# Patient Record
Sex: FEMALE | Race: WHITE | Employment: FULL TIME | URBAN - METROPOLITAN AREA
[De-identification: names, ages, dates, MRNs, and addresses within clinical notes are randomized per-mention and may not be internally consistent; named-entity substitution may affect disease eponyms.]

---

## 2017-01-10 ENCOUNTER — OFFICE VISIT (OUTPATIENT)
Dept: PRIMARY CARE CLINIC | Age: 52
End: 2017-01-10

## 2017-01-10 VITALS
HEIGHT: 63 IN | SYSTOLIC BLOOD PRESSURE: 112 MMHG | DIASTOLIC BLOOD PRESSURE: 78 MMHG | WEIGHT: 166 LBS | BODY MASS INDEX: 29.41 KG/M2

## 2017-01-10 DIAGNOSIS — J30.89 ALLERGIC RHINITIS DUE TO DUST: ICD-10-CM

## 2017-01-10 DIAGNOSIS — Z12.11 COLON CANCER SCREENING: ICD-10-CM

## 2017-01-10 PROCEDURE — 99204 OFFICE O/P NEW MOD 45 MIN: CPT | Performed by: INTERNAL MEDICINE

## 2017-01-10 RX ORDER — SUMATRIPTAN 100 MG/1
100 TABLET, FILM COATED ORAL
Qty: 36 TABLET | Refills: 1 | Status: SHIPPED | OUTPATIENT
Start: 2017-01-10 | End: 2017-06-09 | Stop reason: SDUPTHER

## 2017-01-10 RX ORDER — FLUTICASONE PROPIONATE 50 MCG
SPRAY, SUSPENSION (ML) NASAL
Qty: 1 BOTTLE | Refills: 5 | Status: SHIPPED | OUTPATIENT
Start: 2017-01-10

## 2017-01-10 RX ORDER — SUMATRIPTAN 6 MG/.5ML
6 INJECTION, SOLUTION SUBCUTANEOUS
Qty: 3 ML | Refills: 1 | Status: SHIPPED | OUTPATIENT
Start: 2017-01-10 | End: 2017-05-15 | Stop reason: SDUPTHER

## 2017-01-10 RX ORDER — AMITRIPTYLINE HYDROCHLORIDE 25 MG/1
25 TABLET, FILM COATED ORAL NIGHTLY
Qty: 30 TABLET | Refills: 1 | Status: SHIPPED | OUTPATIENT
Start: 2017-01-10 | End: 2017-03-28

## 2017-01-11 PROBLEM — J30.89 ALLERGIC RHINITIS DUE TO DUST: Status: ACTIVE | Noted: 2017-01-11

## 2017-01-19 ENCOUNTER — TELEPHONE (OUTPATIENT)
Dept: PRIMARY CARE CLINIC | Age: 52
End: 2017-01-19

## 2017-03-28 PROBLEM — R63.5 WEIGHT GAIN: Status: ACTIVE | Noted: 2017-03-28

## 2017-03-28 PROBLEM — G44.40 REBOUND HEADACHE: Status: ACTIVE | Noted: 2017-03-28

## 2017-03-28 PROBLEM — G43.831 INTRACTABLE MENSTRUAL MIGRAINE WITH STATUS MIGRAINOSUS: Status: ACTIVE | Noted: 2017-03-28

## 2017-03-28 PROBLEM — G43.711 INTRACTABLE CHRONIC MIGRAINE WITHOUT AURA AND WITH STATUS MIGRAINOSUS: Status: ACTIVE | Noted: 2017-03-28

## 2017-05-15 ENCOUNTER — TELEPHONE (OUTPATIENT)
Dept: PRIMARY CARE CLINIC | Age: 52
End: 2017-05-15

## 2017-05-15 RX ORDER — CEFUROXIME AXETIL 250 MG/1
TABLET ORAL
Qty: 3 ML | Refills: 1 | Status: SHIPPED | OUTPATIENT
Start: 2017-05-15 | End: 2017-07-26 | Stop reason: SDUPTHER

## 2017-05-26 ENCOUNTER — EMPLOYEE WELLNESS (OUTPATIENT)
Dept: OTHER | Age: 52
End: 2017-05-26

## 2017-05-26 LAB
CHOLESTEROL, TOTAL: 178 MG/DL (ref 0–199)
GLUCOSE BLD-MCNC: 86 MG/DL (ref 70–99)
HDLC SERPL-MCNC: 86 MG/DL (ref 40–60)
LDL CHOLESTEROL CALCULATED: 85 MG/DL
TRIGL SERPL-MCNC: 34 MG/DL (ref 0–150)

## 2017-06-09 RX ORDER — SUMATRIPTAN 100 MG/1
TABLET, FILM COATED ORAL
Qty: 27 TABLET | Refills: 1 | Status: SHIPPED | OUTPATIENT
Start: 2017-06-09 | End: 2017-10-03 | Stop reason: SDUPTHER

## 2017-07-11 ENCOUNTER — TELEPHONE (OUTPATIENT)
Dept: PRIMARY CARE CLINIC | Age: 52
End: 2017-07-11

## 2017-07-27 RX ORDER — CEFUROXIME AXETIL 250 MG/1
TABLET ORAL
Qty: 3 ML | Refills: 1 | Status: SHIPPED | OUTPATIENT
Start: 2017-07-27 | End: 2017-11-08 | Stop reason: SDUPTHER

## 2017-08-09 ENCOUNTER — TELEPHONE (OUTPATIENT)
Dept: PRIMARY CARE CLINIC | Age: 52
End: 2017-08-09

## 2017-10-02 RX ORDER — SUMATRIPTAN 100 MG/1
TABLET, FILM COATED ORAL
Qty: 27 TABLET | Refills: 1 | OUTPATIENT
Start: 2017-10-02

## 2017-10-03 RX ORDER — SUMATRIPTAN 100 MG/1
TABLET, FILM COATED ORAL
Qty: 27 TABLET | Refills: 1 | OUTPATIENT
Start: 2017-10-03

## 2017-10-20 DIAGNOSIS — G43.711 INTRACTABLE CHRONIC MIGRAINE WITHOUT AURA AND WITH STATUS MIGRAINOSUS: ICD-10-CM

## 2017-10-20 RX ORDER — SUMATRIPTAN 100 MG/1
TABLET, FILM COATED ORAL
Qty: 9 TABLET | Refills: 0 | Status: SHIPPED | OUTPATIENT
Start: 2017-10-20 | End: 2017-11-20 | Stop reason: SDUPTHER

## 2017-10-20 NOTE — TELEPHONE ENCOUNTER
10/2/17 sumatriptan 6mg inj #6  8/9/17 sumatriptan tab 100mg #27  7/27/17 sumatriptan 6mg inj #6  6/12/17 sumatriptan tab #27    Are you OK with refill ?      FOV due to return in 1 yr  LOV 9/7/17

## 2017-12-01 ENCOUNTER — HOSPITAL ENCOUNTER (OUTPATIENT)
Dept: MAMMOGRAPHY | Age: 52
Discharge: HOME | End: 2017-12-01
Attending: OBSTETRICS & GYNECOLOGY | Admitting: OBSTETRICS & GYNECOLOGY

## 2017-12-01 DIAGNOSIS — Z12.31 VISIT FOR SCREENING MAMMOGRAM: ICD-10-CM

## 2017-12-14 ENCOUNTER — OFFICE VISIT (OUTPATIENT)
Dept: URGENT CARE | Age: 52
End: 2017-12-14

## 2017-12-14 VITALS
TEMPERATURE: 98.8 F | OXYGEN SATURATION: 98 % | HEIGHT: 63 IN | SYSTOLIC BLOOD PRESSURE: 134 MMHG | RESPIRATION RATE: 14 BRPM | BODY MASS INDEX: 29.41 KG/M2 | HEART RATE: 76 BPM | DIASTOLIC BLOOD PRESSURE: 81 MMHG | WEIGHT: 166 LBS

## 2017-12-14 DIAGNOSIS — R35.0 URINE FREQUENCY: Primary | ICD-10-CM

## 2017-12-14 LAB
BILIRUBIN, POC: NEGATIVE
BLOOD URINE, POC: NEGATIVE
CLARITY, POC: CLEAR
COLOR, POC: YELLOW
GLUCOSE URINE, POC: NEGATIVE
KETONES, POC: NEGATIVE
LEUKOCYTE EST, POC: ABNORMAL
NITRITE, POC: NEGATIVE
PH, POC: 7
PROTEIN, POC: NEGATIVE
SPECIFIC GRAVITY, POC: 1.02
UROBILINOGEN, POC: 0.2

## 2017-12-14 PROCEDURE — 81002 URINALYSIS NONAUTO W/O SCOPE: CPT | Performed by: EMERGENCY MEDICINE

## 2017-12-14 PROCEDURE — 99203 OFFICE O/P NEW LOW 30 MIN: CPT | Performed by: EMERGENCY MEDICINE

## 2017-12-14 RX ORDER — PHENAZOPYRIDINE HYDROCHLORIDE 200 MG/1
200 TABLET, FILM COATED ORAL 3 TIMES DAILY PRN
Qty: 15 TABLET | Refills: 0 | Status: SHIPPED | OUTPATIENT
Start: 2017-12-14 | End: 2018-02-12

## 2017-12-14 RX ORDER — SULFAMETHOXAZOLE AND TRIMETHOPRIM 800; 160 MG/1; MG/1
1 TABLET ORAL 2 TIMES DAILY
Qty: 10 TABLET | Refills: 0 | Status: SHIPPED | OUTPATIENT
Start: 2017-12-14 | End: 2017-12-19

## 2017-12-14 ASSESSMENT — ENCOUNTER SYMPTOMS
EYES NEGATIVE: 1
VOMITING: 0
SHORTNESS OF BREATH: 0
ABDOMINAL PAIN: 0
COUGH: 0
NAUSEA: 0
BACK PAIN: 1

## 2017-12-14 NOTE — PROGRESS NOTES
Instructions    A urinary tract infection, or UTI, is a general term for an infection anywhere between the kidneys and the urethra (where urine comes out). Most UTIs are bladder infections. They often cause pain or burning when you urinate. UTIs are caused by bacteria and can be cured with antibiotics. Be sure to complete your treatment so that the infection goes away. Follow-up care is a key part of your treatment and safety. Be sure to make and go to all appointments, and call your doctor if you are having problems. It's also a good idea to know your test results and keep a list of the medicines you take. How can you care for yourself at home? · Take your antibiotics as directed. Do not stop taking them just because you feel better. You need to take the full course of antibiotics. · Drink extra water and other fluids for the next day or two. This may help wash out the bacteria that are causing the infection. (If you have kidney, heart, or liver disease and have to limit fluids, talk with your doctor before you increase your fluid intake.)  · Avoid drinks that are carbonated or have caffeine. They can irritate the bladder. · Urinate often. Try to empty your bladder each time. · To relieve pain, take a hot bath or lay a heating pad set on low over your lower belly or genital area. Never go to sleep with a heating pad in place. To prevent UTIs  · Drink plenty of water each day. This helps you urinate often, which clears bacteria from your system. (If you have kidney, heart, or liver disease and have to limit fluids, talk with your doctor before you increase your fluid intake.)  · Urinate when you need to. · Urinate right after you have sex. · Change sanitary pads often. · Avoid douches, bubble baths, feminine hygiene sprays, and other feminine hygiene products that have deodorants. · After going to the bathroom, wipe from front to back. When should you call for help?   Call your doctor now or seek

## 2017-12-14 NOTE — PATIENT INSTRUCTIONS
RX: BACTRIM DS, PYRIDIUM    Follow-up with your primary care physician in 1 week if not improving. If you do not have a primary care physician, call [phone] for a referral or visit [email or web address]      Patient Education        Urinary Tract Infection in Women: Care Instructions  Your Care Instructions    A urinary tract infection, or UTI, is a general term for an infection anywhere between the kidneys and the urethra (where urine comes out). Most UTIs are bladder infections. They often cause pain or burning when you urinate. UTIs are caused by bacteria and can be cured with antibiotics. Be sure to complete your treatment so that the infection goes away. Follow-up care is a key part of your treatment and safety. Be sure to make and go to all appointments, and call your doctor if you are having problems. It's also a good idea to know your test results and keep a list of the medicines you take. How can you care for yourself at home? · Take your antibiotics as directed. Do not stop taking them just because you feel better. You need to take the full course of antibiotics. · Drink extra water and other fluids for the next day or two. This may help wash out the bacteria that are causing the infection. (If you have kidney, heart, or liver disease and have to limit fluids, talk with your doctor before you increase your fluid intake.)  · Avoid drinks that are carbonated or have caffeine. They can irritate the bladder. · Urinate often. Try to empty your bladder each time. · To relieve pain, take a hot bath or lay a heating pad set on low over your lower belly or genital area. Never go to sleep with a heating pad in place. To prevent UTIs  · Drink plenty of water each day. This helps you urinate often, which clears bacteria from your system.  (If you have kidney, heart, or liver disease and have to limit fluids, talk with your doctor before you increase your fluid intake.)  · Urinate when you need to.  · Urinate right after you have sex. · Change sanitary pads often. · Avoid douches, bubble baths, feminine hygiene sprays, and other feminine hygiene products that have deodorants. · After going to the bathroom, wipe from front to back. When should you call for help? Call your doctor now or seek immediate medical care if:  ? · Symptoms such as fever, chills, nausea, or vomiting get worse or appear for the first time. ? · You have new pain in your back just below your rib cage. This is called flank pain. ? · There is new blood or pus in your urine. ? · You have any problems with your antibiotic medicine. ? Watch closely for changes in your health, and be sure to contact your doctor if:  ? · You are not getting better after taking an antibiotic for 2 days. ? · Your symptoms go away but then come back. Where can you learn more? Go to [email or web address].[email or web address] org and sign in to your [employer] account. Enter H161 in the [employer] [facility] box to learn more about \"Urinary Tract Infection in Women: Care Instructions. \"     If you do not have an account, please click on the \"Sign Up Now\" link. Current as of: May 12, 2017  Content Version: 11.4  © 5196-9024 Healthwise, Incorporated. Care instructions adapted under license by [facility] ([facility]). If you have questions about a medical condition or this instruction, always ask your healthcare professional. [place] [address] any warranty or liability for your use of this information.

## 2017-12-17 LAB
ORGANISM: ABNORMAL
URINE CULTURE, ROUTINE: ABNORMAL
URINE CULTURE, ROUTINE: ABNORMAL

## 2018-02-12 ENCOUNTER — OFFICE VISIT (OUTPATIENT)
Dept: PRIMARY CARE CLINIC | Age: 53
End: 2018-02-12

## 2018-02-12 VITALS
SYSTOLIC BLOOD PRESSURE: 138 MMHG | BODY MASS INDEX: 29.44 KG/M2 | DIASTOLIC BLOOD PRESSURE: 86 MMHG | WEIGHT: 166.2 LBS | HEART RATE: 72 BPM

## 2018-02-12 DIAGNOSIS — G43.711 INTRACTABLE CHRONIC MIGRAINE WITHOUT AURA AND WITH STATUS MIGRAINOSUS: ICD-10-CM

## 2018-02-12 DIAGNOSIS — Z12.11 COLON CANCER SCREENING: ICD-10-CM

## 2018-02-12 PROCEDURE — 99214 OFFICE O/P EST MOD 30 MIN: CPT | Performed by: INTERNAL MEDICINE

## 2018-02-12 RX ORDER — CHOLECALCIFEROL (VITAMIN D3) 25 MCG
TABLET,CHEWABLE ORAL DAILY
COMMUNITY

## 2018-02-12 RX ORDER — AMITRIPTYLINE HYDROCHLORIDE 25 MG/1
25 TABLET, FILM COATED ORAL NIGHTLY
Qty: 30 TABLET | Refills: 1 | Status: SHIPPED | OUTPATIENT
Start: 2018-02-12 | End: 2018-11-15

## 2018-02-12 RX ORDER — SUMATRIPTAN 100 MG/1
TABLET, FILM COATED ORAL
Qty: 27 TABLET | Refills: 1 | Status: SHIPPED | OUTPATIENT
Start: 2018-02-12 | End: 2018-06-26 | Stop reason: SDUPTHER

## 2018-03-20 VITALS — BODY MASS INDEX: 29.05 KG/M2 | WEIGHT: 164 LBS

## 2018-05-08 ENCOUNTER — EMPLOYEE WELLNESS (OUTPATIENT)
Dept: OTHER | Age: 53
End: 2018-05-08

## 2018-05-08 DIAGNOSIS — G43.711 INTRACTABLE CHRONIC MIGRAINE WITHOUT AURA AND WITH STATUS MIGRAINOSUS: ICD-10-CM

## 2018-05-08 LAB
CHOLESTEROL, TOTAL: 167 MG/DL (ref 0–199)
GLUCOSE BLD-MCNC: 92 MG/DL (ref 70–99)
HDLC SERPL-MCNC: 86 MG/DL (ref 40–60)
LDL CHOLESTEROL CALCULATED: 71 MG/DL
TRIGL SERPL-MCNC: 52 MG/DL (ref 0–150)

## 2018-05-10 RX ORDER — CEFUROXIME AXETIL 250 MG/1
TABLET ORAL
Qty: 3 ML | Refills: 0 | Status: SHIPPED | OUTPATIENT
Start: 2018-05-10 | End: 2018-08-03 | Stop reason: SDUPTHER

## 2018-05-14 VITALS — WEIGHT: 161 LBS | BODY MASS INDEX: 28.52 KG/M2

## 2018-06-08 ENCOUNTER — OFFICE VISIT (OUTPATIENT)
Dept: URGENT CARE | Age: 53
End: 2018-06-08

## 2018-06-08 VITALS
OXYGEN SATURATION: 96 % | HEIGHT: 63 IN | BODY MASS INDEX: 28.53 KG/M2 | SYSTOLIC BLOOD PRESSURE: 125 MMHG | TEMPERATURE: 98 F | HEART RATE: 71 BPM | DIASTOLIC BLOOD PRESSURE: 68 MMHG | RESPIRATION RATE: 14 BRPM | WEIGHT: 161 LBS

## 2018-06-08 DIAGNOSIS — R35.0 URINE FREQUENCY: Primary | ICD-10-CM

## 2018-06-08 LAB
BILIRUBIN, POC: NEGATIVE
BLOOD URINE, POC: NEGATIVE
CLARITY, POC: CLEAR
COLOR, POC: YELLOW
GLUCOSE URINE, POC: NEGATIVE
KETONES, POC: NEGATIVE
LEUKOCYTE EST, POC: NEGATIVE
NITRITE, POC: NEGATIVE
PH, POC: 6.5
PROTEIN, POC: NEGATIVE
SPECIFIC GRAVITY, POC: 1.01
UROBILINOGEN, POC: 0.2

## 2018-06-08 PROCEDURE — 81002 URINALYSIS NONAUTO W/O SCOPE: CPT | Performed by: EMERGENCY MEDICINE

## 2018-06-08 PROCEDURE — 99214 OFFICE O/P EST MOD 30 MIN: CPT | Performed by: EMERGENCY MEDICINE

## 2018-06-08 RX ORDER — SULFAMETHOXAZOLE AND TRIMETHOPRIM 800; 160 MG/1; MG/1
1 TABLET ORAL 2 TIMES DAILY
Qty: 10 TABLET | Refills: 0 | Status: SHIPPED | OUTPATIENT
Start: 2018-06-08 | End: 2018-06-13

## 2018-06-08 ASSESSMENT — ENCOUNTER SYMPTOMS
ABDOMINAL PAIN: 0
BACK PAIN: 0
COUGH: 0
EYES NEGATIVE: 1
SHORTNESS OF BREATH: 0
NAUSEA: 0
VOMITING: 0

## 2018-06-10 LAB — URINE CULTURE, ROUTINE: NORMAL

## 2018-06-15 ENCOUNTER — OFFICE VISIT (OUTPATIENT)
Dept: PRIMARY CARE CLINIC | Age: 53
End: 2018-06-15

## 2018-06-15 VITALS
HEIGHT: 63 IN | SYSTOLIC BLOOD PRESSURE: 122 MMHG | BODY MASS INDEX: 28.56 KG/M2 | WEIGHT: 161.2 LBS | DIASTOLIC BLOOD PRESSURE: 68 MMHG

## 2018-06-15 DIAGNOSIS — E55.9 VITAMIN D DEFICIENCY: ICD-10-CM

## 2018-06-15 DIAGNOSIS — E53.8 B12 DEFICIENCY: ICD-10-CM

## 2018-06-15 DIAGNOSIS — G43.831 INTRACTABLE MENSTRUAL MIGRAINE WITH STATUS MIGRAINOSUS: ICD-10-CM

## 2018-06-15 DIAGNOSIS — G44.40 REBOUND HEADACHE: Primary | ICD-10-CM

## 2018-06-15 DIAGNOSIS — G47.33 OSA (OBSTRUCTIVE SLEEP APNEA): ICD-10-CM

## 2018-06-15 PROCEDURE — 99214 OFFICE O/P EST MOD 30 MIN: CPT | Performed by: INTERNAL MEDICINE

## 2018-06-15 PROCEDURE — 36415 COLL VENOUS BLD VENIPUNCTURE: CPT | Performed by: INTERNAL MEDICINE

## 2018-06-16 LAB
VITAMIN B-12: 1564 PG/ML (ref 211–911)
VITAMIN D 25-HYDROXY: 49.6 NG/ML

## 2018-06-26 DIAGNOSIS — G43.711 INTRACTABLE CHRONIC MIGRAINE WITHOUT AURA AND WITH STATUS MIGRAINOSUS: ICD-10-CM

## 2018-06-26 RX ORDER — SUMATRIPTAN 100 MG/1
TABLET, FILM COATED ORAL
Qty: 27 TABLET | Refills: 2 | Status: SHIPPED | OUTPATIENT
Start: 2018-06-26 | End: 2018-12-13 | Stop reason: SDUPTHER

## 2018-08-03 DIAGNOSIS — G43.711 INTRACTABLE CHRONIC MIGRAINE WITHOUT AURA AND WITH STATUS MIGRAINOSUS: ICD-10-CM

## 2018-08-03 RX ORDER — CEFUROXIME AXETIL 250 MG/1
TABLET ORAL
Qty: 3 ML | Refills: 0 | Status: SHIPPED | OUTPATIENT
Start: 2018-08-03 | End: 2018-09-14 | Stop reason: SDUPTHER

## 2018-09-14 DIAGNOSIS — G43.711 INTRACTABLE CHRONIC MIGRAINE WITHOUT AURA AND WITH STATUS MIGRAINOSUS: ICD-10-CM

## 2018-09-17 RX ORDER — CEFUROXIME AXETIL 250 MG/1
TABLET ORAL
Qty: 3 ML | Refills: 0 | Status: SHIPPED | OUTPATIENT
Start: 2018-09-17 | End: 2018-10-29 | Stop reason: SDUPTHER

## 2018-09-18 ENCOUNTER — OFFICE VISIT (OUTPATIENT)
Dept: PRIMARY CARE CLINIC | Age: 53
End: 2018-09-18

## 2018-09-18 VITALS
DIASTOLIC BLOOD PRESSURE: 70 MMHG | TEMPERATURE: 97.9 F | SYSTOLIC BLOOD PRESSURE: 120 MMHG | WEIGHT: 160 LBS | BODY MASS INDEX: 28.34 KG/M2

## 2018-09-18 DIAGNOSIS — R30.0 DYSURIA: Primary | ICD-10-CM

## 2018-09-18 LAB
BILIRUBIN, POC: NORMAL
BLOOD URINE, POC: NORMAL
CLARITY, POC: NORMAL
COLOR, POC: NORMAL
GLUCOSE URINE, POC: 100
KETONES, POC: NORMAL
LEUKOCYTE EST, POC: NORMAL
NITRITE, POC: POSITIVE
PH, POC: 5.5
PROTEIN, POC: 100
SPECIFIC GRAVITY, POC: 1.01
UROBILINOGEN, POC: 1

## 2018-09-18 PROCEDURE — 81002 URINALYSIS NONAUTO W/O SCOPE: CPT | Performed by: INTERNAL MEDICINE

## 2018-09-18 PROCEDURE — 99214 OFFICE O/P EST MOD 30 MIN: CPT | Performed by: INTERNAL MEDICINE

## 2018-09-18 RX ORDER — SULFAMETHOXAZOLE AND TRIMETHOPRIM 800; 160 MG/1; MG/1
1 TABLET ORAL 2 TIMES DAILY
Qty: 14 TABLET | Refills: 0 | Status: SHIPPED | OUTPATIENT
Start: 2018-09-18 | End: 2018-09-25

## 2018-09-18 ASSESSMENT — PATIENT HEALTH QUESTIONNAIRE - PHQ9
2. FEELING DOWN, DEPRESSED OR HOPELESS: 0
SUM OF ALL RESPONSES TO PHQ QUESTIONS 1-9: 0
SUM OF ALL RESPONSES TO PHQ QUESTIONS 1-9: 0
1. LITTLE INTEREST OR PLEASURE IN DOING THINGS: 0
SUM OF ALL RESPONSES TO PHQ9 QUESTIONS 1 & 2: 0

## 2018-09-18 NOTE — PROGRESS NOTES
Rose Mary Starkey is a 52 y.o. female presenting today with c/o    Urinary Tract Infection: Patient complains of dysuria, frequency, urgency, burning with urination, hematuria She has had symptoms for 3 days. Patient also complains of feeling chilled. Patient denies back pain. Patient does not have a history of recurrent UTI. Patient does not have a history of pyelonephritis. Review of Systems - comprehensive review of systems negative except as noted in HPI    No Known Allergies    Past Medical History:   Diagnosis Date    Headache(784.0)        Past Surgical History:   Procedure Laterality Date    CHOLECYSTECTOMY         Family History   Problem Relation Age of Onset    Heart Disease Mother     High Blood Pressure Mother     Hypertension Mother     Diabetes Father     Heart Disease Father     Hypertension Father     Migraines Other     Stroke Other     Migraines Other     Dementia Other     Diabetes Other     Hypertension Other     Dementia Other     Alcohol Abuse Other     Cancer Other        Social History     Social History    Marital status:      Spouse name: N/A    Number of children: N/A    Years of education: N/A     Occupational History    Not on file. Social History Main Topics    Smoking status: Never Smoker    Smokeless tobacco: Never Used    Alcohol use No    Drug use: No    Sexual activity: Not on file     Other Topics Concern    Not on file     Social History Narrative    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        Daughter Steve Ascencio         Current Outpatient Prescriptions on File Prior to Visit   Medication Sig Dispense Refill    SUMAtriptan Succinate 6 MG/0.5ML SOAJ INJECT 0.5ML UNDER THE SKIN AS NEEDED FOR MIGRAINE. MAY REPEAT ONCE AFTER 1 HOUR. NOT TO EXCEED 2 DOSES IN 24 HOURS 3 mL 0    SUMAtriptan (IMITREX) 100 MG tablet TAKE 1 TABLET BY MOUTH AS NEEDED FOR HEADACHE. MAY REPEAT ONCE AFTER 2 HOURS NOT TO EXCEED 2 TABLETS PER 24 HOURS.  32 tablet 2    Phenazopyridine HCl (AZO TABS PO) Take by mouth      Cyanocobalamin (B-12) 1000 MCG CAPS Take by mouth      Cholecalciferol (VITAMIN D3) 5000 units TABS Take by mouth      Magnesium 400 MG CAPS Take 800 mg by mouth      amitriptyline (ELAVIL) 25 MG tablet Take 1 tablet by mouth nightly To prevent migraines 30 tablet 1    fluticasone (FLONASE) 50 MCG/ACT nasal spray 2 sprays each nostril daily 1 Bottle 5    Aspirin-Acetaminophen-Caffeine (EXCEDRIN PO) Take  by mouth. No current facility-administered medications on file prior to visit. Vitals:    09/18/18 0851   BP: 120/70   Temp: 97.9 °F (36.6 °C)         Wt Readings from Last 3 Encounters:   09/18/18 160 lb (72.6 kg)   06/15/18 161 lb 3.2 oz (73.1 kg)   06/08/18 161 lb (73 kg)     BP Readings from Last 3 Encounters:   09/18/18 120/70   06/15/18 122/68   06/08/18 125/68         /70   Temp 97.9 °F (36.6 °C) (Oral)   Wt 160 lb (72.6 kg)   BMI 28.34 kg/m²   General appearance: alert and appears stated age  Eyes: negative findings: lids and lashes normal and conjunctivae and sclerae normal  Throat: lips, mucosa, and tongue normal; teeth and gums normal  Back: symmetric, no curvature. ROM normal. No CVA tenderness. Lungs: clear to auscultation bilaterally  Heart: regular rate and rhythm, S1, S2 normal, no murmur, click, rub or gallop  Abdomen: soft, suprapubic tenderness  Skin: Skin is warm and dry. [facility]: normal mood and affect. speech is normal and behavior is normal. Judgment, cognition and memory are normal.     not applicable    Assessment and Plan  1.  Dysuria  UA consistent with UTIj  cx sent  Bactrim-with chills-7 day course  - Urine Culture  - POCT Urinalysis no Micro

## 2018-09-21 LAB
ORGANISM: ABNORMAL
URINE CULTURE, ROUTINE: ABNORMAL
URINE CULTURE, ROUTINE: ABNORMAL

## 2018-10-23 ENCOUNTER — OFFICE VISIT (OUTPATIENT)
Dept: PRIMARY CARE CLINIC | Age: 53
End: 2018-10-23
Payer: COMMERCIAL

## 2018-10-23 VITALS
TEMPERATURE: 98.2 F | DIASTOLIC BLOOD PRESSURE: 57 MMHG | SYSTOLIC BLOOD PRESSURE: 139 MMHG | WEIGHT: 162.4 LBS | RESPIRATION RATE: 12 BRPM | HEART RATE: 75 BPM | BODY MASS INDEX: 28.77 KG/M2 | HEIGHT: 63 IN

## 2018-10-23 DIAGNOSIS — J02.9 SORE THROAT: Primary | ICD-10-CM

## 2018-10-23 LAB — S PYO AG THROAT QL: NORMAL

## 2018-10-23 PROCEDURE — 87880 STREP A ASSAY W/OPTIC: CPT | Performed by: PHYSICIAN ASSISTANT

## 2018-10-23 PROCEDURE — 99202 OFFICE O/P NEW SF 15 MIN: CPT | Performed by: PHYSICIAN ASSISTANT

## 2018-10-23 ASSESSMENT — ENCOUNTER SYMPTOMS
RHINORRHEA: 0
TROUBLE SWALLOWING: 0
WHEEZING: 0
COUGH: 1
VOICE CHANGE: 0
SORE THROAT: 1
SINUS PRESSURE: 1

## 2018-10-25 ENCOUNTER — OFFICE VISIT (OUTPATIENT)
Dept: PRIMARY CARE CLINIC | Age: 53
End: 2018-10-25
Payer: COMMERCIAL

## 2018-10-25 VITALS
DIASTOLIC BLOOD PRESSURE: 66 MMHG | WEIGHT: 158.2 LBS | BODY MASS INDEX: 28.02 KG/M2 | SYSTOLIC BLOOD PRESSURE: 116 MMHG | TEMPERATURE: 98.5 F

## 2018-10-25 DIAGNOSIS — J02.9 VIRAL PHARYNGITIS: Primary | ICD-10-CM

## 2018-10-25 PROCEDURE — 99213 OFFICE O/P EST LOW 20 MIN: CPT | Performed by: INTERNAL MEDICINE

## 2018-10-25 NOTE — PROGRESS NOTES
Yumiko Daley is a 52 y.o. female presenting today with c/o    Sore Throat: Patient complains of sore throat. Associated symptoms include dry cough, nasal blockage, sinus and nasal congestion and sore throat. Onset of symptoms was 4 days ago, unchanged since that time. She is drinking plenty of fluids. She has not had recent close exposure to someone with proven streptococcal pharyngitis. Second eval. Neg strep at clinic at work  Review of Systems - comprehensive review of systems negative except as noted in HPI    No Known Allergies    Past Medical History:   Diagnosis Date    Headache(784.0)        Past Surgical History:   Procedure Laterality Date    CHOLECYSTECTOMY         Family History   Problem Relation Age of Onset    Heart Disease Mother     High Blood Pressure Mother     Hypertension Mother     Diabetes Father     Heart Disease Father     Hypertension Father     Migraines Other     Stroke Other     Migraines Other     Dementia Other     Diabetes Other     Hypertension Other     Dementia Other     Alcohol Abuse Other     Cancer Other        Social History     Social History    Marital status:      Spouse name: N/A    Number of children: N/A    Years of education: N/A     Occupational History    Not on file. 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 MAY REPEAT ONCE AFTER 2 HOURS NOT TO EXCEED 2 TABLETS PER 24 HOURS.  27 tablet 2    Cyanocobalamin (B-12) 1000 MCG CAPS Take by mouth      Cholecalciferol (VITAMIN D3) 5000 units TABS

## 2018-10-29 ENCOUNTER — TELEPHONE (OUTPATIENT)
Dept: PRIMARY CARE CLINIC | Age: 53
End: 2018-10-29

## 2018-10-29 DIAGNOSIS — G43.711 INTRACTABLE CHRONIC MIGRAINE WITHOUT AURA AND WITH STATUS MIGRAINOSUS: ICD-10-CM

## 2018-10-29 RX ORDER — CEFUROXIME AXETIL 250 MG/1
TABLET ORAL
Qty: 3 ML | Refills: 0 | Status: SHIPPED | OUTPATIENT
Start: 2018-10-29 | End: 2018-12-11 | Stop reason: SDUPTHER

## 2018-10-29 RX ORDER — AMOXICILLIN 875 MG/1
875 TABLET, COATED ORAL 2 TIMES DAILY
Qty: 14 TABLET | Refills: 0 | Status: SHIPPED | OUTPATIENT
Start: 2018-10-29 | End: 2018-11-05

## 2018-11-15 ENCOUNTER — OFFICE VISIT (OUTPATIENT)
Dept: FAMILY MEDICINE CLINIC | Age: 53
End: 2018-11-15
Payer: COMMERCIAL

## 2018-11-15 VITALS
BODY MASS INDEX: 28.24 KG/M2 | HEART RATE: 88 BPM | DIASTOLIC BLOOD PRESSURE: 74 MMHG | SYSTOLIC BLOOD PRESSURE: 116 MMHG | OXYGEN SATURATION: 94 % | WEIGHT: 159.4 LBS | RESPIRATION RATE: 16 BRPM

## 2018-11-15 DIAGNOSIS — Z00.00 ANNUAL PHYSICAL EXAM: Primary | ICD-10-CM

## 2018-11-15 DIAGNOSIS — Z12.11 SCREENING FOR COLON CANCER: ICD-10-CM

## 2018-11-15 DIAGNOSIS — R06.83 SNORING: ICD-10-CM

## 2018-11-15 PROCEDURE — 90688 IIV4 VACCINE SPLT 0.5 ML IM: CPT | Performed by: FAMILY MEDICINE

## 2018-11-15 PROCEDURE — 99386 PREV VISIT NEW AGE 40-64: CPT | Performed by: FAMILY MEDICINE

## 2018-11-15 PROCEDURE — 90471 IMMUNIZATION ADMIN: CPT | Performed by: FAMILY MEDICINE

## 2018-12-11 DIAGNOSIS — G43.711 INTRACTABLE CHRONIC MIGRAINE WITHOUT AURA AND WITH STATUS MIGRAINOSUS: ICD-10-CM

## 2018-12-11 RX ORDER — CEFUROXIME AXETIL 250 MG/1
TABLET ORAL
Qty: 3 ML | Refills: 0 | Status: SHIPPED | OUTPATIENT
Start: 2018-12-11 | End: 2019-01-16 | Stop reason: SDUPTHER

## 2018-12-11 NOTE — TELEPHONE ENCOUNTER
Medication and Quantity requested: SUMAtriptan Succinate 6 MG/0.5ML SOAJ  #27     Last Visit  11/15/18    Pharmacy and phone number updated in EPIC:  yes

## 2018-12-13 ENCOUNTER — PATIENT MESSAGE (OUTPATIENT)
Dept: FAMILY MEDICINE CLINIC | Age: 53
End: 2018-12-13

## 2018-12-13 ENCOUNTER — TELEPHONE (OUTPATIENT)
Dept: FAMILY MEDICINE CLINIC | Age: 53
End: 2018-12-13

## 2018-12-13 DIAGNOSIS — G43.711 INTRACTABLE CHRONIC MIGRAINE WITHOUT AURA AND WITH STATUS MIGRAINOSUS: ICD-10-CM

## 2018-12-13 RX ORDER — SUMATRIPTAN 100 MG/1
TABLET, FILM COATED ORAL
Qty: 27 TABLET | Refills: 2 | Status: SHIPPED | OUTPATIENT
Start: 2018-12-13 | End: 2019-05-06 | Stop reason: SDUPTHER

## 2019-01-02 ENCOUNTER — TELEPHONE (OUTPATIENT)
Dept: FAMILY MEDICINE CLINIC | Age: 54
End: 2019-01-02

## 2019-01-05 ENCOUNTER — HOSPITAL ENCOUNTER (OUTPATIENT)
Dept: MAMMOGRAPHY | Age: 54
Discharge: HOME | End: 2019-01-05
Payer: COMMERCIAL

## 2019-01-05 DIAGNOSIS — Z12.31 VISIT FOR SCREENING MAMMOGRAM: ICD-10-CM

## 2019-01-05 PROCEDURE — 77063 BREAST TOMOSYNTHESIS BI: CPT

## 2019-01-07 ENCOUNTER — TELEPHONE (OUTPATIENT)
Dept: FAMILY MEDICINE CLINIC | Age: 54
End: 2019-01-07

## 2019-01-16 DIAGNOSIS — G43.711 INTRACTABLE CHRONIC MIGRAINE WITHOUT AURA AND WITH STATUS MIGRAINOSUS: ICD-10-CM

## 2019-01-16 RX ORDER — CEFUROXIME AXETIL 250 MG/1
TABLET ORAL
Qty: 3 ML | Refills: 3 | Status: SHIPPED | OUTPATIENT
Start: 2019-01-16 | End: 2019-06-24 | Stop reason: SDUPTHER

## 2019-02-05 ENCOUNTER — OFFICE VISIT (OUTPATIENT)
Dept: PRIMARY CARE CLINIC | Age: 54
End: 2019-02-05
Payer: COMMERCIAL

## 2019-02-05 VITALS
BODY MASS INDEX: 29.05 KG/M2 | DIASTOLIC BLOOD PRESSURE: 81 MMHG | HEART RATE: 73 BPM | WEIGHT: 164 LBS | TEMPERATURE: 98.7 F | RESPIRATION RATE: 14 BRPM | SYSTOLIC BLOOD PRESSURE: 129 MMHG | OXYGEN SATURATION: 99 %

## 2019-02-05 DIAGNOSIS — N30.00 ACUTE CYSTITIS WITHOUT HEMATURIA: Primary | ICD-10-CM

## 2019-02-05 DIAGNOSIS — R30.0 DYSURIA: ICD-10-CM

## 2019-02-05 PROCEDURE — 99212 OFFICE O/P EST SF 10 MIN: CPT | Performed by: PHYSICIAN ASSISTANT

## 2019-02-05 PROCEDURE — 81002 URINALYSIS NONAUTO W/O SCOPE: CPT | Performed by: PHYSICIAN ASSISTANT

## 2019-02-05 RX ORDER — NITROFURANTOIN 25; 75 MG/1; MG/1
100 CAPSULE ORAL 2 TIMES DAILY
Qty: 20 CAPSULE | Refills: 0 | Status: SHIPPED | OUTPATIENT
Start: 2019-02-05 | End: 2019-02-15

## 2019-02-05 RX ORDER — PHENAZOPYRIDINE HYDROCHLORIDE 200 MG/1
200 TABLET, FILM COATED ORAL 3 TIMES DAILY PRN
Qty: 9 TABLET | Refills: 1 | Status: SHIPPED | OUTPATIENT
Start: 2019-02-05 | End: 2019-02-08

## 2019-02-05 ASSESSMENT — ENCOUNTER SYMPTOMS
ABDOMINAL PAIN: 0
VOMITING: 0
NAUSEA: 0

## 2019-02-08 ENCOUNTER — OFFICE VISIT (OUTPATIENT)
Dept: PRIMARY CARE CLINIC | Age: 54
End: 2019-02-08
Payer: COMMERCIAL

## 2019-02-08 VITALS
TEMPERATURE: 98.4 F | BODY MASS INDEX: 29.06 KG/M2 | SYSTOLIC BLOOD PRESSURE: 112 MMHG | WEIGHT: 164 LBS | HEIGHT: 63 IN | DIASTOLIC BLOOD PRESSURE: 78 MMHG | HEART RATE: 71 BPM

## 2019-02-08 DIAGNOSIS — N30.00 ACUTE CYSTITIS WITHOUT HEMATURIA: Primary | ICD-10-CM

## 2019-02-08 LAB
BILIRUBIN, POC: ABNORMAL
BLOOD URINE, POC: ABNORMAL
CLARITY, POC: CLEAR
COLOR, POC: ABNORMAL
GLUCOSE URINE, POC: ABNORMAL
KETONES, POC: ABNORMAL
LEUKOCYTE EST, POC: ABNORMAL
NITRITE, POC: ABNORMAL
PH, POC: 6.5
PROTEIN, POC: ABNORMAL
SPECIFIC GRAVITY, POC: 1.01
UROBILINOGEN, POC: 0.2

## 2019-02-08 PROCEDURE — 99213 OFFICE O/P EST LOW 20 MIN: CPT | Performed by: EMERGENCY MEDICINE

## 2019-02-08 PROCEDURE — 81002 URINALYSIS NONAUTO W/O SCOPE: CPT | Performed by: EMERGENCY MEDICINE

## 2019-02-08 ASSESSMENT — ENCOUNTER SYMPTOMS
BACK PAIN: 0
NAUSEA: 0
ABDOMINAL PAIN: 0
SHORTNESS OF BREATH: 0
COUGH: 0
EYES NEGATIVE: 1
VOMITING: 0

## 2019-02-10 LAB — URINE CULTURE, ROUTINE: NORMAL

## 2019-02-25 ENCOUNTER — OFFICE VISIT (OUTPATIENT)
Dept: FAMILY MEDICINE CLINIC | Age: 54
End: 2019-02-25
Payer: COMMERCIAL

## 2019-02-25 VITALS
HEART RATE: 75 BPM | OXYGEN SATURATION: 98 % | SYSTOLIC BLOOD PRESSURE: 124 MMHG | DIASTOLIC BLOOD PRESSURE: 70 MMHG | BODY MASS INDEX: 28.59 KG/M2 | TEMPERATURE: 97.8 F | WEIGHT: 161.4 LBS | RESPIRATION RATE: 15 BRPM

## 2019-02-25 DIAGNOSIS — J06.9 PROTRACTED URI: Primary | ICD-10-CM

## 2019-02-25 PROCEDURE — 99213 OFFICE O/P EST LOW 20 MIN: CPT | Performed by: FAMILY MEDICINE

## 2019-02-25 RX ORDER — AZITHROMYCIN 250 MG/1
250 TABLET, FILM COATED ORAL DAILY
Qty: 6 TABLET | Refills: 0 | Status: SHIPPED | OUTPATIENT
Start: 2019-02-25 | End: 2019-03-02

## 2019-02-25 ASSESSMENT — PATIENT HEALTH QUESTIONNAIRE - PHQ9
SUM OF ALL RESPONSES TO PHQ QUESTIONS 1-9: 0
2. FEELING DOWN, DEPRESSED OR HOPELESS: 0
1. LITTLE INTEREST OR PLEASURE IN DOING THINGS: 0
SUM OF ALL RESPONSES TO PHQ QUESTIONS 1-9: 0
SUM OF ALL RESPONSES TO PHQ9 QUESTIONS 1 & 2: 0

## 2019-03-09 ENCOUNTER — HOSPITAL ENCOUNTER (OUTPATIENT)
Dept: GENERAL RADIOLOGY | Age: 54
Discharge: HOME | End: 2019-03-09
Payer: COMMERCIAL

## 2019-03-09 ENCOUNTER — HOSPITAL ENCOUNTER (OUTPATIENT)
Dept: ULTRASOUND IMAGING | Age: 54
Discharge: HOME | End: 2019-03-09
Payer: COMMERCIAL

## 2019-03-09 DIAGNOSIS — N30.21 CHRONIC CYSTITIS WITH HEMATURIA: ICD-10-CM

## 2019-03-09 DIAGNOSIS — N30.21 OTHER CHRONIC CYSTITIS WITH HEMATURIA: ICD-10-CM

## 2019-03-09 DIAGNOSIS — N32.81 OVERACTIVE BLADDER: ICD-10-CM

## 2019-03-09 PROCEDURE — 76770 US EXAM ABDO BACK WALL COMP: CPT

## 2019-03-09 PROCEDURE — 74018 RADEX ABDOMEN 1 VIEW: CPT

## 2019-03-22 ENCOUNTER — HOSPITAL ENCOUNTER (OUTPATIENT)
Dept: CT IMAGING | Age: 54
Discharge: HOME | End: 2019-03-22
Payer: COMMERCIAL

## 2019-03-22 DIAGNOSIS — N20.0 URIC ACID NEPHROLITHIASIS: ICD-10-CM

## 2019-03-22 PROCEDURE — 74176 CT ABD & PELVIS W/O CONTRAST: CPT

## 2019-04-24 DIAGNOSIS — Z00.00 ANNUAL PHYSICAL EXAM: ICD-10-CM

## 2019-04-24 LAB
A/G RATIO: 1.2 (ref 1.1–2.2)
ALBUMIN SERPL-MCNC: 4.2 G/DL (ref 3.4–5)
ALP BLD-CCNC: 72 U/L (ref 40–129)
ALT SERPL-CCNC: 15 U/L (ref 10–40)
ANION GAP SERPL CALCULATED.3IONS-SCNC: 9 MMOL/L (ref 3–16)
AST SERPL-CCNC: 16 U/L (ref 15–37)
BASOPHILS ABSOLUTE: 0.1 K/UL (ref 0–0.2)
BASOPHILS RELATIVE PERCENT: 1.2 %
BILIRUB SERPL-MCNC: <0.2 MG/DL (ref 0–1)
BUN BLDV-MCNC: 20 MG/DL (ref 7–20)
CALCIUM SERPL-MCNC: 9.3 MG/DL (ref 8.3–10.6)
CHLORIDE BLD-SCNC: 103 MMOL/L (ref 99–110)
CHOLESTEROL, TOTAL: 183 MG/DL (ref 0–199)
CO2: 26 MMOL/L (ref 21–32)
CREAT SERPL-MCNC: 0.6 MG/DL (ref 0.6–1.1)
EOSINOPHILS ABSOLUTE: 0.2 K/UL (ref 0–0.6)
EOSINOPHILS RELATIVE PERCENT: 4.1 %
GFR AFRICAN AMERICAN: >60
GFR NON-AFRICAN AMERICAN: >60
GLOBULIN: 3.4 G/DL
GLUCOSE BLD-MCNC: 88 MG/DL (ref 70–99)
HCT VFR BLD CALC: 40.5 % (ref 36–48)
HDLC SERPL-MCNC: 78 MG/DL (ref 40–60)
HEMOGLOBIN: 13.6 G/DL (ref 12–16)
LDL CHOLESTEROL CALCULATED: 94 MG/DL
LYMPHOCYTES ABSOLUTE: 1.2 K/UL (ref 1–5.1)
LYMPHOCYTES RELATIVE PERCENT: 28.2 %
MCH RBC QN AUTO: 30.5 PG (ref 26–34)
MCHC RBC AUTO-ENTMCNC: 33.7 G/DL (ref 31–36)
MCV RBC AUTO: 90.5 FL (ref 80–100)
MONOCYTES ABSOLUTE: 0.4 K/UL (ref 0–1.3)
MONOCYTES RELATIVE PERCENT: 9.7 %
NEUTROPHILS ABSOLUTE: 2.3 K/UL (ref 1.7–7.7)
NEUTROPHILS RELATIVE PERCENT: 56.8 %
PDW BLD-RTO: 13.8 % (ref 12.4–15.4)
PLATELET # BLD: 202 K/UL (ref 135–450)
PMV BLD AUTO: 7.9 FL (ref 5–10.5)
POTASSIUM SERPL-SCNC: 5.3 MMOL/L (ref 3.5–5.1)
RBC # BLD: 4.48 M/UL (ref 4–5.2)
SODIUM BLD-SCNC: 138 MMOL/L (ref 136–145)
TOTAL PROTEIN: 7.6 G/DL (ref 6.4–8.2)
TRIGL SERPL-MCNC: 54 MG/DL (ref 0–150)
TSH REFLEX: 2.36 UIU/ML (ref 0.27–4.2)
VLDLC SERPL CALC-MCNC: 11 MG/DL
WBC # BLD: 4.1 K/UL (ref 4–11)

## 2019-05-01 ENCOUNTER — OFFICE VISIT (OUTPATIENT)
Dept: FAMILY MEDICINE CLINIC | Age: 54
End: 2019-05-01
Payer: COMMERCIAL

## 2019-05-01 VITALS
HEART RATE: 85 BPM | DIASTOLIC BLOOD PRESSURE: 78 MMHG | SYSTOLIC BLOOD PRESSURE: 126 MMHG | OXYGEN SATURATION: 97 % | RESPIRATION RATE: 16 BRPM | BODY MASS INDEX: 27.99 KG/M2 | WEIGHT: 158 LBS

## 2019-05-01 DIAGNOSIS — N32.81 OVERACTIVE BLADDER: ICD-10-CM

## 2019-05-01 DIAGNOSIS — G43.711 INTRACTABLE CHRONIC MIGRAINE WITHOUT AURA AND WITH STATUS MIGRAINOSUS: Primary | ICD-10-CM

## 2019-05-01 DIAGNOSIS — K63.9 COLON WALL THICKENING: ICD-10-CM

## 2019-05-01 PROBLEM — G43.831 INTRACTABLE MENSTRUAL MIGRAINE WITH STATUS MIGRAINOSUS: Status: RESOLVED | Noted: 2017-03-28 | Resolved: 2019-05-01

## 2019-05-01 PROCEDURE — 99214 OFFICE O/P EST MOD 30 MIN: CPT | Performed by: FAMILY MEDICINE

## 2019-05-01 RX ORDER — FESOTERODINE FUMARATE 4 MG/1
4 TABLET, EXTENDED RELEASE ORAL DAILY
COMMUNITY

## 2019-05-01 NOTE — PROGRESS NOTES
Richard Jones is a 52 y.o. female. HPI:  F/u migraines- going to massage therapy for head/neck/shoulder massage which is helping. imitrex relieves migraines. Usually gets them 3-4x per week. Has gone through menopause. Has not found any triggers in diet/sleep/environment etc for migraines. Has never been on prophylaxis in past.    Long hx trouble sleeping. No hx using any OTC or rx sleep aids. Had kidney US and CT scan in February with Dr. Court Cee. Was put on toviaz to take for overactive bladder symptoms and macrobid to take PRN UTIs. CT showed colonic wall thickening, had just gotten over GI bug which has resolved. Has colonoscopy in June. ROS:  Gen:  Denies fever, chills, headaches. HEENT:  Denies cold symptoms, sore throat. CV:  Denies chest pain or tightness, palpitations. Pulm:  Denies shortness of breath, cough. Abd:  Denies abdominal pain, change in bowel habits. I have reviewed the patient's medical/surgical/family/social in detail and updated the computerized patient record as appropriate. Current Outpatient Medications   Medication Sig Dispense Refill    fesoterodine (TOVIAZ) 4 MG TB24 ER tablet Take 4 mg by mouth daily      SUMAtriptan Succinate 6 MG/0.5ML SOAJ INJECT 0.5ML UNDER THE SKIN AS NEEDED FOR MIGRAINE. MAY REPEAT ONCE AFTER 1 HOUR. NOT TO EXCEED 2 DOSES IN 24 HOURS 3 mL 3    SUMAtriptan (IMITREX) 100 MG tablet TAKE 1 TABLET BY MOUTH AS NEEDED FOR HEADACHE. MAY REPEAT ONCE AFTER 2 HOURS NOT TO EXCEED 2 TABLETS PER 24 HOURS. 27 tablet 2    Cyanocobalamin (B-12) 1000 MCG CAPS Take by mouth      Cholecalciferol (VITAMIN D3) 5000 units TABS Take by mouth      fluticasone (FLONASE) 50 MCG/ACT nasal spray 2 sprays each nostril daily 1 Bottle 5    Aspirin-Acetaminophen-Caffeine (EXCEDRIN PO) Take  by mouth. No current facility-administered medications for this visit.         Past Medical History:   Diagnosis Date    Allergic rhinitis due to dust 1/11/2017    Chronic migraine 1/10/2017    Frequent UTI     Overactive bladder     Vitamin D deficiency      Past Surgical History:   Procedure Laterality Date    CHOLECYSTECTOMY      CYSTOSCOPY       Family History   Problem Relation Age of Onset    Heart Disease Mother     High Blood Pressure Mother     Hypertension Mother     Diabetes Father     Heart Disease Father     Hypertension Father     Migraines Other     Stroke Other     Migraines Other     Dementia Other     Diabetes Other     Hypertension Other     Dementia Other     Alcohol Abuse Other     Cancer Other      Social History     Socioeconomic History    Marital status:      Spouse name: Not on file    Number of children: Not on file    Years of education: Not on file    Highest education level: Not on file   Occupational History    Not on file   Social Needs    Financial resource strain: Not on file    Food insecurity:     Worry: Not on file     Inability: Not on file    Transportation needs:     Medical: Not on file     Non-medical: Not on file   Tobacco Use    Smoking status: Never Smoker    Smokeless tobacco: Never Used   Substance and Sexual Activity    Alcohol use: No    Drug use: No    Sexual activity: Not on file   Lifestyle    Physical activity:     Days per week: Not on file     Minutes per session: Not on file    Stress: Not on file   Relationships    Social connections:     Talks on phone: Not on file     Gets together: Not on file     Attends [employer] service: Not on file     Active member of club or organization: Not on file     Attends meetings of clubs or organizations: Not on file     Relationship status: Not on file    Intimate partner violence:     Fear of current or ex partner: Not on file     Emotionally abused: Not on file     Physically abused: Not on file     Forced sexual activity: Not on file   Other Topics Concern    Not on file   Social History Narrative    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 NECK:  Supple without adenopathy. CV:  Regular rate and rhythm, S1 and S2 normal, no murmurs, clicks, gallops or rubs. No edema. PULM:  Chest is clear, no wheezing or rales. Normal symmetric air entry throughout both lung fields. ABD: soft, non-tender, non-distended. Normal bowel sounds. No organomegaly   PSYCH: normal mood and affect. Intact judgement and insight  NEURO: A&O x 3    ASSESSMENT/PLAN:  1. Intractable chronic migraine without aura and with status migrainosus  Stable on imitrex PRN  Discussed prophylactic treatment given frequency of headaches. Would try elavil given trouble sleeping. Pt declines today, will consider in the future as massage is helping. 2. Overactive bladder  Continue toviaz  macrobid PRN UTI symptoms    3. Colon wall thickening  Seen on CT. Likely d/t acute GI illness she had at the time. Was a self limited, likely viral infection. Screening colonoscopy as scheduled.

## 2019-05-06 DIAGNOSIS — G43.711 INTRACTABLE CHRONIC MIGRAINE WITHOUT AURA AND WITH STATUS MIGRAINOSUS: ICD-10-CM

## 2019-05-06 RX ORDER — SUMATRIPTAN 100 MG/1
TABLET, FILM COATED ORAL
Qty: 27 TABLET | Refills: 2 | Status: SHIPPED | OUTPATIENT
Start: 2019-05-06 | End: 2020-04-07

## 2019-05-13 ENCOUNTER — TELEPHONE (OUTPATIENT)
Dept: FAMILY MEDICINE CLINIC | Age: 54
End: 2019-05-13

## 2019-05-13 NOTE — TELEPHONE ENCOUNTER
Patient dropped off [facility] Form. States the results haven't transferred yet from [place]. Wanted form filled out just in case they don't. Form placed on Lokesh's desk.

## 2019-06-14 ENCOUNTER — ANESTHESIA EVENT (OUTPATIENT)
Dept: ENDOSCOPY | Age: 54
End: 2019-06-14
Payer: COMMERCIAL

## 2019-06-14 ENCOUNTER — HOSPITAL ENCOUNTER (OUTPATIENT)
Age: 54
Setting detail: OUTPATIENT SURGERY
Discharge: HOME | End: 2019-06-14
Attending: INTERNAL MEDICINE | Admitting: INTERNAL MEDICINE
Payer: COMMERCIAL

## 2019-06-14 ENCOUNTER — ANESTHESIA (OUTPATIENT)
Dept: ENDOSCOPY | Age: 54
End: 2019-06-14
Payer: COMMERCIAL

## 2019-06-14 VITALS
HEART RATE: 78 BPM | HEIGHT: 63 IN | DIASTOLIC BLOOD PRESSURE: 78 MMHG | SYSTOLIC BLOOD PRESSURE: 126 MMHG | OXYGEN SATURATION: 100 % | TEMPERATURE: 97 F | WEIGHT: 158 LBS | BODY MASS INDEX: 28 KG/M2 | RESPIRATION RATE: 16 BRPM

## 2019-06-14 VITALS
DIASTOLIC BLOOD PRESSURE: 67 MMHG | RESPIRATION RATE: 19 BRPM | SYSTOLIC BLOOD PRESSURE: 112 MMHG | OXYGEN SATURATION: 100 %

## 2019-06-14 PROCEDURE — 2500000003 HC RX 250 WO HCPCS: Performed by: NURSE ANESTHETIST, CERTIFIED REGISTERED

## 2019-06-14 PROCEDURE — 7100000011 HC PHASE II RECOVERY - ADDTL 15 MIN: Performed by: INTERNAL MEDICINE

## 2019-06-14 PROCEDURE — 3609009500 HC COLONOSCOPY DIAGNOSTIC OR SCREENING: Performed by: INTERNAL MEDICINE

## 2019-06-14 PROCEDURE — 7100000010 HC PHASE II RECOVERY - FIRST 15 MIN: Performed by: INTERNAL MEDICINE

## 2019-06-14 PROCEDURE — 2580000003 HC RX 258: Performed by: ANESTHESIOLOGY

## 2019-06-14 PROCEDURE — 3700000001 HC ADD 15 MINUTES (ANESTHESIA): Performed by: INTERNAL MEDICINE

## 2019-06-14 PROCEDURE — 6360000002 HC RX W HCPCS: Performed by: NURSE ANESTHETIST, CERTIFIED REGISTERED

## 2019-06-14 PROCEDURE — 3700000000 HC ANESTHESIA ATTENDED CARE: Performed by: INTERNAL MEDICINE

## 2019-06-14 PROCEDURE — 6370000000 HC RX 637 (ALT 250 FOR IP): Performed by: INTERNAL MEDICINE

## 2019-06-14 PROCEDURE — 2709999900 HC NON-CHARGEABLE SUPPLY: Performed by: INTERNAL MEDICINE

## 2019-06-14 RX ORDER — SODIUM CHLORIDE 0.9 % (FLUSH) 0.9 %
10 SYRINGE (ML) INJECTION PRN
Status: DISCONTINUED | OUTPATIENT
Start: 2019-06-14 | End: 2019-06-14 | Stop reason: HOSPADM

## 2019-06-14 RX ORDER — LABETALOL HYDROCHLORIDE 5 MG/ML
5 INJECTION, SOLUTION INTRAVENOUS EVERY 10 MIN PRN
Status: DISCONTINUED | OUTPATIENT
Start: 2019-06-14 | End: 2019-06-14 | Stop reason: HOSPADM

## 2019-06-14 RX ORDER — ONDANSETRON 2 MG/ML
4 INJECTION INTRAMUSCULAR; INTRAVENOUS
Status: DISCONTINUED | OUTPATIENT
Start: 2019-06-14 | End: 2019-06-14 | Stop reason: HOSPADM

## 2019-06-14 RX ORDER — LOPERAMIDE HYDROCHLORIDE 2 MG/1
2 CAPSULE ORAL 4 TIMES DAILY PRN
COMMUNITY

## 2019-06-14 RX ORDER — SODIUM CHLORIDE 9 MG/ML
INJECTION, SOLUTION INTRAVENOUS CONTINUOUS
Status: DISCONTINUED | OUTPATIENT
Start: 2019-06-14 | End: 2019-06-14 | Stop reason: HOSPADM

## 2019-06-14 RX ORDER — SODIUM CHLORIDE 0.9 % (FLUSH) 0.9 %
10 SYRINGE (ML) INJECTION EVERY 12 HOURS SCHEDULED
Status: DISCONTINUED | OUTPATIENT
Start: 2019-06-14 | End: 2019-06-14 | Stop reason: HOSPADM

## 2019-06-14 RX ORDER — PROPOFOL 10 MG/ML
INJECTION, EMULSION INTRAVENOUS PRN
Status: DISCONTINUED | OUTPATIENT
Start: 2019-06-14 | End: 2019-06-14 | Stop reason: SDUPTHER

## 2019-06-14 RX ORDER — LIDOCAINE HYDROCHLORIDE 20 MG/ML
INJECTION, SOLUTION EPIDURAL; INFILTRATION; INTRACAUDAL; PERINEURAL PRN
Status: DISCONTINUED | OUTPATIENT
Start: 2019-06-14 | End: 2019-06-14 | Stop reason: SDUPTHER

## 2019-06-14 RX ORDER — PROMETHAZINE HYDROCHLORIDE 25 MG/ML
6.25 INJECTION, SOLUTION INTRAMUSCULAR; INTRAVENOUS
Status: DISCONTINUED | OUTPATIENT
Start: 2019-06-14 | End: 2019-06-14 | Stop reason: HOSPADM

## 2019-06-14 RX ORDER — ACETAMINOPHEN 500 MG
500 TABLET ORAL EVERY 6 HOURS PRN
COMMUNITY
End: 2022-09-07

## 2019-06-14 RX ADMIN — PROPOFOL 30 MG: 10 INJECTION, EMULSION INTRAVENOUS at 08:35

## 2019-06-14 RX ADMIN — PROPOFOL 60 MG: 10 INJECTION, EMULSION INTRAVENOUS at 08:26

## 2019-06-14 RX ADMIN — PROPOFOL 80 MG: 10 INJECTION, EMULSION INTRAVENOUS at 08:22

## 2019-06-14 RX ADMIN — LIDOCAINE HYDROCHLORIDE 100 MG: 20 INJECTION, SOLUTION EPIDURAL; INFILTRATION; INTRACAUDAL; PERINEURAL at 08:22

## 2019-06-14 RX ADMIN — SODIUM CHLORIDE: 9 INJECTION, SOLUTION INTRAVENOUS at 07:18

## 2019-06-14 RX ADMIN — PROPOFOL 80 MG: 10 INJECTION, EMULSION INTRAVENOUS at 08:30

## 2019-06-14 ASSESSMENT — PAIN SCALES - GENERAL
PAINLEVEL_OUTOF10: 0

## 2019-06-14 ASSESSMENT — PAIN - FUNCTIONAL ASSESSMENT: PAIN_FUNCTIONAL_ASSESSMENT: 0-10

## 2019-06-14 NOTE — ANESTHESIA POSTPROCEDURE EVALUATION
[facility] Department of Anesthesiology  Post-Anesthesia Note       Name:  Denise Carlson                                  Age:  62 y.o. MRN:  [id number]     Last Vitals & Oxygen Saturation: /71   Pulse 89   Temp 97 °F (36.1 °C) (Temporal)   Resp 16   Ht 5' 3\" (1.6 m)   Wt 158 lb (71.7 kg)   LMP 08/10/2016   SpO2 98%   BMI 27.99 kg/m²   Patient Vitals for the past 4 hrs:   BP Temp Temp src Pulse Resp SpO2 Height Weight   06/14/19 0843 131/71 97 °F (36.1 °C) Temporal 89 16 98 % -- --   06/14/19 0701 138/60 97.1 °F (36.2 °C) Temporal 100 16 99 % 5' 3\" (1.6 m) 158 lb (71.7 kg)       Level of consciousness:  Awake, alert    Respiratory: Respirations easy, no distress. Stable. Cardiovascular: Hemodynamically stable. Hydration: Adequate. PONV: Adequately managed. Post-op pain: Adequately controlled. Post-op assessment: Tolerated anesthetic well without complication. Complications:  None.     Philip Najera MD  June 14, 2019   8:59 AM

## 2019-06-14 NOTE — PROGRESS NOTES
Name:  Bhupendra Sequeira  Age:  62 y.o.  CSN:  [id number]            Past Medical History:        Diagnosis Date    Allergic rhinitis due to dust 1/11/2017    Chronic migraine 1/10/2017    Frequent UTI     Overactive bladder     Vitamin D deficiency        Past Surgical History:      Procedure Laterality Date    CHOLECYSTECTOMY      CYSTOSCOPY         Medications Prior to Admission:  Medications Prior to Admission: acetaminophen (TYLENOL) 500 MG tablet, Take 500 mg by mouth every 6 hours as needed for Pain  SUMAtriptan (IMITREX) 100 MG tablet, TAKE 1 TABLET BY MOUTH AT ONSET OF HEADACHE, MAY REPEAT ONCE IN 2 HOURS IF NEEDED. DO NOT EXCEED 2 TABLETS IN 24 HOURS  Aspirin-Acetaminophen-Caffeine (EXCEDRIN PO), Take  by mouth.   loperamide (IMODIUM) 2 MG capsule, Take 2 mg by mouth 4 times daily as needed for Diarrhea  FIBER ADULT GUMMIES PO, Take by mouth daily  fesoterodine (TOVIAZ) 4 MG TB24 ER tablet, Take 4 mg by mouth daily  SUMAtriptan Succinate 6 MG/0.5ML SOAJ, INJECT 0.5ML UNDER THE SKIN AS NEEDED FOR MIGRAINE. MAY REPEAT ONCE AFTER 1 HOUR. NOT TO EXCEED 2 DOSES IN 24 HOURS  Cyanocobalamin (B-12) 1000 MCG CAPS, Take by mouth daily   Cholecalciferol (VITAMIN D3) 5000 units TABS, Take by mouth daily   fluticasone (FLONASE) 50 MCG/ACT nasal spray, 2 sprays each nostril daily    Allergies:  Sulfa antibiotics and Ciprofloxacin    Social History:  Social History     Socioeconomic History    Marital status:      Spouse name: Not on file    Number of children: Not on file    Years of education: Not on file    Highest education level: Not on file   Occupational History    Not on file   Social Needs    Financial resource strain: Not on file    Food insecurity:     Worry: Not on file     Inability: Not on file    Transportation needs:     Medical: Not on file     Non-medical: Not on file   Tobacco Use    Smoking status: Never Smoker    Smokeless tobacco: Never Used   Substance and Sexual Activity   

## 2019-06-24 DIAGNOSIS — G43.711 INTRACTABLE CHRONIC MIGRAINE WITHOUT AURA AND WITH STATUS MIGRAINOSUS: ICD-10-CM

## 2019-06-24 RX ORDER — CEFUROXIME AXETIL 250 MG/1
TABLET ORAL
Qty: 3 ML | Refills: 3 | Status: SHIPPED | OUTPATIENT
Start: 2019-06-24 | End: 2019-11-25 | Stop reason: SDUPTHER

## 2019-06-26 ENCOUNTER — TELEPHONE (OUTPATIENT)
Dept: FAMILY MEDICINE CLINIC | Age: 54
End: 2019-06-26

## 2019-06-26 ENCOUNTER — OFFICE VISIT (OUTPATIENT)
Dept: DERMATOLOGY | Age: 54
End: 2019-06-26
Payer: COMMERCIAL

## 2019-06-26 DIAGNOSIS — L81.4 SOLAR LENTIGINOSIS: Primary | ICD-10-CM

## 2019-06-26 DIAGNOSIS — D22.9 MULTIPLE BENIGN MELANOCYTIC NEVI: ICD-10-CM

## 2019-06-26 DIAGNOSIS — Z12.83 SKIN CANCER SCREENING: ICD-10-CM

## 2019-06-26 PROCEDURE — 99203 OFFICE O/P NEW LOW 30 MIN: CPT | Performed by: DERMATOLOGY

## 2019-06-26 NOTE — PROGRESS NOTES
[address] [facility], [facility] ([facility]) Physicians    Previous clinic visit: none     CC:  mole check, spot of concern on nose    HPI:    1.) Here today for FBSE. Denies new or changing nevi. No personal or family h/o skin cancers. Was seen by outside derm months ago who recommended she start Efudex cream to nasal tip; no lesions there today. Patient never underwent the tx.     2.)  Complains of tan spots on face and photoexposed areas of trunk and extremities. No tx to date. Admits to ample sun exposure when younger. Pt works in scheduling at [employer]    DERM HISTORY:   Personal history of NMSC or MM- no    Family history of NMSC or MM- no   Sunburns easily- Yes   Uses sunscreen- Yes  History of tanning bed use- No    ADDITIONAL HISTORY:    I have reviewed past medical and surgical histories, current medications, allergies, social and family histories as documented in the patient's electronic medical record.      Current Outpatient Medications   Medication Sig Dispense Refill    SUMAtriptan Succinate 6 MG/0.5ML SOAJ INJECT 0.5ML UNDER THE SKIN AS NEEDED FOR MIGRAINE. MAY REPEAT ONCE AFTER 1 HOUR. DO NOT EXCEED 2 DOSES IN 24 HOURS 3 mL 3    acetaminophen (TYLENOL) 500 MG tablet Take 500 mg by mouth every 6 hours as needed for Pain      loperamide (IMODIUM) 2 MG capsule Take 2 mg by mouth 4 times daily as needed for Diarrhea      FIBER ADULT GUMMIES PO Take by mouth daily      SUMAtriptan (IMITREX) 100 MG tablet TAKE 1 TABLET BY MOUTH AT ONSET OF HEADACHE, MAY REPEAT ONCE IN 2 HOURS IF NEEDED. DO NOT EXCEED 2 TABLETS IN 24 HOURS 27 tablet 2    fesoterodine (TOVIAZ) 4 MG TB24 ER tablet Take 4 mg by mouth daily      Cyanocobalamin (B-12) 1000 MCG CAPS Take by mouth daily       Cholecalciferol (VITAMIN D3) 5000 units TABS Take by mouth daily       Aspirin-Acetaminophen-Caffeine (EXCEDRIN PO) Take  by mouth.       fluticasone (FLONASE) 50 MCG/ACT nasal spray 2 sprays each nostril daily 1 Bottle 5 No current facility-administered medications for this visit. ROS:    General: No fevers, chills, sweats, unexplained weight loss or weight gain, fatigue, malaise   Skin: Denies additional lesions    Heme: No history of bleeding diatheses    Allergy: Denies seasonal or environmental allergies or other medication allergies     PHYSICAL EXAM:    General: Well-appearing, NAD      Integument: Examination was performed of the following and were unremarkable except as otherwise noted below:psych/neuro, scalp, hair, face, ears, conjunctivae/eyelids, gums/teeth/lips, buccal mucosa, oropharynx, neck, breast/axilla/chest, abdomen, groin, back, buttocks, RUE, LUE, RLE, LLE, digits/nails. Genital exam deferred per patient.      Abnormalities noted include:    1.) Torso and extremities with several variably sized scattered brown to tan round smooth melanocytic macules and papules  2.)  Face, upper back, shoulders, upper chest, dorsum of arms and legs with numerous ovoid tan macules      ASSESSMENT AND PLAN:    1.)  Multiple scattered acquired melanocytic nevi with banal features:   - Reassurance  - Edu: patient regarding sun protection strategies, including use of broad spectrum sunscreen with SPF of at least 30, sun guard clothing, broad brimmed hat, sunglasses, and sun avoidance during peak hours of the day. ABCDE's of melanoma also reviewed  - Ok to defer tx with Efudex as no AKs noted on exam today    2.)  Solar lentiginosis:  - [employer] as above; samples of sunscreen provided      Return to Clinic: 1 yr and prn changes   Discussed plan with patient and/or primary caretaker. Patient to call clinic with any questions / concerns. Reviewed side effects of treatment(s) and/or medication(s) with patient and/or primary caretaker.    AVS provided or is available on [employer]   ____________________________________________________________________________   Tamia Ruff MD, MPH, FAAD  [facility] DERMATOLOGY, [address] [facility] [address]

## 2019-06-26 NOTE — LETTER
[facility]  [address] [place] [address]  Phone: [phone]  Fax: [phone]    Sam Moura MD        June 26, 2019     Bernardo Salas MD  [place] [id number] [address]    Patient: Rosalee Day  MR Number: [id number]  YOB: 1965  Date of Visit: 6/26/2019    Dear Dr. Bernardo Salas:    Thank you for the request for consultation for Kisha Silverio to me for the evaluation of moles. Below are the relevant portions of my assessment and plan of care. If you have questions, please do not hesitate to call me. I look forward to following Garcia Foss along with you.     Sincerely,        Sam Moura MD

## 2019-06-26 NOTE — TELEPHONE ENCOUNTER
Scanned into /attached to encounter/given to Dr. Sara Patel for completion. Please fax to employee services once completed. Thank you!

## 2019-07-26 PROBLEM — Z12.83 SKIN CANCER SCREENING: Status: RESOLVED | Noted: 2019-06-26 | Resolved: 2019-07-26

## 2019-08-07 ENCOUNTER — OFFICE VISIT (OUTPATIENT)
Dept: FAMILY MEDICINE CLINIC | Age: 54
End: 2019-08-07
Payer: COMMERCIAL

## 2019-08-07 VITALS
BODY MASS INDEX: 28.17 KG/M2 | SYSTOLIC BLOOD PRESSURE: 122 MMHG | OXYGEN SATURATION: 98 % | WEIGHT: 159 LBS | HEART RATE: 82 BPM | RESPIRATION RATE: 15 BRPM | DIASTOLIC BLOOD PRESSURE: 72 MMHG

## 2019-08-07 DIAGNOSIS — B88.0 CHIGGER BITE: Primary | ICD-10-CM

## 2019-08-07 PROCEDURE — 99212 OFFICE O/P EST SF 10 MIN: CPT | Performed by: FAMILY MEDICINE

## 2019-08-07 NOTE — PROGRESS NOTES
Shelia Moy is a 62 y.o. female. HPI:  C/o bug bite on abdomen x 1 week. Is itchy. Has gotten better with cortisone ointment. Worried about tick bite. No fevers/chill. No other rash. No joint pain. ROS:  Gen:  Denies fever, chills, headaches. HEENT:  Denies cold symptoms, sore throat. CV:  Denies chest pain or tightness, palpitations. Pulm:  Denies shortness of breath, cough. Abd:  Denies abdominal pain, change in bowel habits. I have reviewed the patient's medical/surgical/family/social in detail and updated the computerized patient record as appropriate. Current Outpatient Medications   Medication Sig Dispense Refill    SUMAtriptan Succinate 6 MG/0.5ML SOAJ INJECT 0.5ML UNDER THE SKIN AS NEEDED FOR MIGRAINE. MAY REPEAT ONCE AFTER 1 HOUR. DO NOT EXCEED 2 DOSES IN 24 HOURS 3 mL 3    acetaminophen (TYLENOL) 500 MG tablet Take 500 mg by mouth every 6 hours as needed for Pain      loperamide (IMODIUM) 2 MG capsule Take 2 mg by mouth 4 times daily as needed for Diarrhea      FIBER ADULT GUMMIES PO Take by mouth daily      SUMAtriptan (IMITREX) 100 MG tablet TAKE 1 TABLET BY MOUTH AT ONSET OF HEADACHE, MAY REPEAT ONCE IN 2 HOURS IF NEEDED. DO NOT EXCEED 2 TABLETS IN 24 HOURS 27 tablet 2    fesoterodine (TOVIAZ) 4 MG TB24 ER tablet Take 4 mg by mouth daily      Cyanocobalamin (B-12) 1000 MCG CAPS Take by mouth daily       Cholecalciferol (VITAMIN D3) 5000 units TABS Take by mouth daily       fluticasone (FLONASE) 50 MCG/ACT nasal spray 2 sprays each nostril daily 1 Bottle 5    Aspirin-Acetaminophen-Caffeine (EXCEDRIN PO) Take  by mouth. No current facility-administered medications for this visit.         Past Medical History:   Diagnosis Date    Allergic rhinitis due to dust 1/11/2017    Chronic migraine 1/10/2017    Frequent UTI     Overactive bladder     Vitamin D deficiency      Past Surgical History:   Procedure Laterality Date    CHOLECYSTECTOMY      COLONOSCOPY N/A

## 2019-09-19 DIAGNOSIS — G43.711 INTRACTABLE CHRONIC MIGRAINE WITHOUT AURA AND WITH STATUS MIGRAINOSUS: ICD-10-CM

## 2019-09-19 RX ORDER — SUMATRIPTAN 100 MG/1
TABLET, FILM COATED ORAL
Qty: 27 TABLET | Refills: 2 | Status: SHIPPED | OUTPATIENT
Start: 2019-09-19 | End: 2020-02-24

## 2019-11-01 ENCOUNTER — OFFICE VISIT (OUTPATIENT)
Dept: SLEEP MEDICINE | Age: 54
End: 2019-11-01
Payer: COMMERCIAL

## 2019-11-01 VITALS
WEIGHT: 157 LBS | SYSTOLIC BLOOD PRESSURE: 135 MMHG | DIASTOLIC BLOOD PRESSURE: 80 MMHG | BODY MASS INDEX: 27.82 KG/M2 | RESPIRATION RATE: 16 BRPM | OXYGEN SATURATION: 96 % | HEIGHT: 63 IN | HEART RATE: 82 BPM

## 2019-11-01 DIAGNOSIS — G43.711 INTRACTABLE CHRONIC MIGRAINE WITHOUT AURA AND WITH STATUS MIGRAINOSUS: ICD-10-CM

## 2019-11-01 DIAGNOSIS — G47.33 OBSTRUCTIVE SLEEP APNEA: Primary | ICD-10-CM

## 2019-11-01 PROCEDURE — 99204 OFFICE O/P NEW MOD 45 MIN: CPT | Performed by: PSYCHIATRY & NEUROLOGY

## 2019-11-01 ASSESSMENT — SLEEP AND FATIGUE QUESTIONNAIRES
HOW LIKELY ARE YOU TO NOD OFF OR FALL ASLEEP WHILE SITTING QUIETLY AFTER LUNCH WITHOUT ALCOHOL: 0
HOW LIKELY ARE YOU TO NOD OFF OR FALL ASLEEP WHILE SITTING AND READING: 1
ESS TOTAL SCORE: 3
NECK CIRCUMFERENCE (INCHES): 14.5
HOW LIKELY ARE YOU TO NOD OFF OR FALL ASLEEP WHILE WATCHING TV: 1
HOW LIKELY ARE YOU TO NOD OFF OR FALL ASLEEP WHILE SITTING AND TALKING TO SOMEONE: 0
HOW LIKELY ARE YOU TO NOD OFF OR FALL ASLEEP WHILE LYING DOWN TO REST IN THE AFTERNOON WHEN CIRCUMSTANCES PERMIT: 1
HOW LIKELY ARE YOU TO NOD OFF OR FALL ASLEEP IN A CAR, WHILE STOPPED FOR A FEW MINUTES IN TRAFFIC: 0
HOW LIKELY ARE YOU TO NOD OFF OR FALL ASLEEP WHEN YOU ARE A PASSENGER IN A CAR FOR AN HOUR WITHOUT A BREAK: 0
HOW LIKELY ARE YOU TO NOD OFF OR FALL ASLEEP WHILE SITTING INACTIVE IN A PUBLIC PLACE: 0

## 2019-11-01 ASSESSMENT — ENCOUNTER SYMPTOMS
ALLERGIC/IMMUNOLOGIC NEGATIVE: 1
RESPIRATORY NEGATIVE: 1
GASTROINTESTINAL NEGATIVE: 1
EYES NEGATIVE: 1

## 2019-11-04 ENCOUNTER — TELEPHONE (OUTPATIENT)
Dept: SLEEP CENTER | Age: 54
End: 2019-11-04

## 2019-11-25 DIAGNOSIS — G43.711 INTRACTABLE CHRONIC MIGRAINE WITHOUT AURA AND WITH STATUS MIGRAINOSUS: ICD-10-CM

## 2019-11-25 RX ORDER — CEFUROXIME AXETIL 250 MG/1
TABLET ORAL
Qty: 27 ML | Refills: 2 | Status: SHIPPED | OUTPATIENT
Start: 2019-11-25 | End: 2019-11-26 | Stop reason: SDUPTHER

## 2019-11-26 DIAGNOSIS — G43.711 INTRACTABLE CHRONIC MIGRAINE WITHOUT AURA AND WITH STATUS MIGRAINOSUS: ICD-10-CM

## 2019-11-26 RX ORDER — CEFUROXIME AXETIL 250 MG/1
0.5 TABLET ORAL PRN
Qty: 3 SYRINGE | Refills: 2 | Status: SHIPPED | OUTPATIENT
Start: 2019-11-26 | End: 2020-02-19

## 2019-11-27 ENCOUNTER — PATIENT MESSAGE (OUTPATIENT)
Dept: FAMILY MEDICINE CLINIC | Age: 54
End: 2019-11-27

## 2019-12-18 ENCOUNTER — TELEPHONE (OUTPATIENT)
Dept: FAMILY MEDICINE CLINIC | Age: 54
End: 2019-12-18

## 2019-12-20 ENCOUNTER — HOSPITAL ENCOUNTER (OUTPATIENT)
Dept: SLEEP CENTER | Age: 54
Discharge: HOME | End: 2019-12-20
Payer: COMMERCIAL

## 2019-12-20 DIAGNOSIS — G47.33 OBSTRUCTIVE SLEEP APNEA: ICD-10-CM

## 2019-12-20 DIAGNOSIS — G43.711 INTRACTABLE CHRONIC MIGRAINE WITHOUT AURA AND WITH STATUS MIGRAINOSUS: ICD-10-CM

## 2019-12-20 PROCEDURE — 95806 SLEEP STUDY UNATT&RESP EFFT: CPT

## 2019-12-20 PROCEDURE — 95806 SLEEP STUDY UNATT&RESP EFFT: CPT | Performed by: PSYCHIATRY & NEUROLOGY

## 2019-12-26 ENCOUNTER — TELEPHONE (OUTPATIENT)
Dept: PULMONOLOGY | Age: 54
End: 2019-12-26

## 2019-12-26 NOTE — TELEPHONE ENCOUNTER
HST Sleep study showed mild THO. AHI was 5  per hr. And O2 Desaturations to 91%.   Dr Justino Harris titration at [facility]  Patient may also come in for follow up to discuss other options

## 2020-01-18 ENCOUNTER — HOSPITAL ENCOUNTER (OUTPATIENT)
Dept: MAMMOGRAPHY | Age: 55
Discharge: HOME | End: 2020-01-18
Payer: COMMERCIAL

## 2020-01-18 PROCEDURE — 77063 BREAST TOMOSYNTHESIS BI: CPT

## 2020-02-14 ENCOUNTER — OFFICE VISIT (OUTPATIENT)
Dept: SLEEP MEDICINE | Age: 55
End: 2020-02-14
Payer: COMMERCIAL

## 2020-02-14 VITALS
RESPIRATION RATE: 16 BRPM | OXYGEN SATURATION: 98 % | SYSTOLIC BLOOD PRESSURE: 135 MMHG | DIASTOLIC BLOOD PRESSURE: 88 MMHG | HEART RATE: 70 BPM | WEIGHT: 158 LBS | BODY MASS INDEX: 28 KG/M2 | HEIGHT: 63 IN

## 2020-02-14 PROCEDURE — 99213 OFFICE O/P EST LOW 20 MIN: CPT | Performed by: PSYCHIATRY & NEUROLOGY

## 2020-02-14 NOTE — PROGRESS NOTES
Allergies as of 02/14/2020 - Review Complete 02/14/2020   Allergen Reaction Noted    Sulfa antibiotics Hives 02/05/2019    Ciprofloxacin Other (See Comments) 11/15/2018       Patient Active Problem List   Diagnosis    Allergic rhinitis due to dust    Intractable chronic migraine without aura and with status migrainosus    Overactive bladder    Solar lentiginosis    Multiple benign melanocytic nevi    Mild obstructive sleep apnea       Past Medical History:   Diagnosis Date    Allergic rhinitis due to dust 1/11/2017    Chronic migraine 1/10/2017    Frequent UTI     Mild obstructive sleep apnea     Overactive bladder     Vitamin D deficiency        Past Surgical History:   Procedure Laterality Date    CHOLECYSTECTOMY      COLONOSCOPY N/A 6/14/2019    COLONOSCOPY performed by Twila De Anda MD at [address]         Family History   Problem Relation Age of Onset    Heart Disease Mother     High Blood Pressure Mother     Hypertension Mother     Diabetes Father     Heart Disease Father     Hypertension Father     Migraines Other     Stroke Other     Migraines Other     Dementia Other     Diabetes Other     Hypertension Other     Dementia Other     Alcohol Abuse Other     Cancer Other        Review of Systems   Constitutional: Positive for diaphoresis and fatigue. Genitourinary: Positive for frequency. Neurological: Positive for headaches. Objective:     Vitals:  Weight BMI Neck circumference    Wt Readings from Last 3 Encounters:   02/14/20 158 lb (71.7 kg)   11/01/19 157 lb (71.2 kg)   08/07/19 159 lb (72.1 kg)    Body mass index is 27.99 kg/m².        BP HR SaO2   BP Readings from Last 3 Encounters:   02/14/20 135/88   11/01/19 135/80   08/07/19 122/72    Pulse Readings from Last 3 Encounters:   02/14/20 70   11/01/19 82   08/07/19 82    SpO2 Readings from Last 3 Encounters:   02/14/20 98%   11/01/19 96%   08/07/19 98%      Themandibular molar Class :   [x]1 []2 []3      Mallampati I Airway Classification:   []1 []2 [x]3 []4      Physical Exam  Vitals signs and nursing note reviewed. Constitutional:       Appearance: Normal appearance. HENT:      Head: Atraumatic. Nose: Nose normal.      Mouth/Throat:      Mouth: Mucous membranes are moist.   Eyes:      Extraocular Movements: Extraocular movements intact. Neck:      Musculoskeletal: Normal range of motion and neck supple. Cardiovascular:      Rate and Rhythm: Normal rate and regular rhythm. Pulmonary:      Effort: Pulmonary effort is normal.      Breath sounds: Normal breath sounds. Musculoskeletal: Normal range of motion. Skin:     General: Skin is warm. Neurological:      General: No focal deficit present. Psychiatric:         Mood and Affect: Mood normal.         Assessment:   Mild Obstructive Sleep Apnea/Hypopnea Syndrome      Diagnosis Orders   1. Obstructive sleep apnea       Plan: Will try APAP between 5 and 15 cm  Will consider  Mandibular advancement device , if she could not tolerate the CPAP, but will send also to Dr Nate Campbell for consultation. No orders of the defined types were placed in this encounter. Return in about 8 weeks (around 4/10/2020) for Reveiwing CPAP usage and compliance report and tro.     Donna Talavera MD  Medical Director - [facility]

## 2020-02-19 RX ORDER — CEFUROXIME AXETIL 250 MG/1
TABLET ORAL
Qty: 3 ML | Refills: 3 | Status: SHIPPED | OUTPATIENT
Start: 2020-02-19 | End: 2020-10-29 | Stop reason: SDUPTHER

## 2020-02-24 RX ORDER — SUMATRIPTAN 100 MG/1
TABLET, FILM COATED ORAL
Qty: 27 TABLET | Refills: 0 | Status: SHIPPED | OUTPATIENT
Start: 2020-02-24 | End: 2020-04-07

## 2020-02-24 NOTE — TELEPHONE ENCOUNTER
Medication:   Requested Prescriptions     Pending Prescriptions Disp Refills    SUMAtriptan (IMITREX) 100 MG tablet [Pharmacy Med Name: SUMATRIPTAN 100MG TABLETS] 27 tablet 2     Sig: TAKE 1 TABLET BY MOUTH AT ONSET OF HEADACHE, MAY REPEAT ONCE IN 2 HOURS IF NEEDED. DO NOT EXCEED 2 TABLETS IN 24 HOURS        Last Filled:  09.19.2019 #27 w/ 2 refills     Patient Phone Number: [phone] (home) [phone] (work)    Last appt: 8/7/2019   Next appt: Visit date not found    Last OARRS: No flowsheet data found.

## 2020-04-07 RX ORDER — SUMATRIPTAN 100 MG/1
TABLET, FILM COATED ORAL
Qty: 27 TABLET | Refills: 0 | Status: SHIPPED | OUTPATIENT
Start: 2020-04-07 | End: 2020-06-17 | Stop reason: SDUPTHER

## 2020-05-29 ENCOUNTER — TELEPHONE (OUTPATIENT)
Dept: PULMONOLOGY | Age: 55
End: 2020-05-29

## 2020-05-29 NOTE — PROGRESS NOTES
Jacinta Hagen         : 1965                        DME: Ronny Kahn     Diagnosis: [x] THO (G47.33) [] CSA (G47.31) [] Apnea (G47.30)   Length of Need: [] 12 Months [x] 99 Months [] Other:    Machine (EMORY!): [x] Respironics Dream Station      Auto [x] ResMed AirSense     Auto [] Other:     [x]  CPAP () [] Bilevel ()   Mode: [x] Auto [] Spontaneous    Mode: [] Auto [] Spontaneous           5 and 15 cm                 Comfort Settings:   - Ramp Pressure: 4 cmH2O                                        - Ramp time: 15 min                                     -  Flex/EPR - 3 full time                                    - For ResMed Bilevel (TiMax-4 sec   TiMin- 0.2 sec)     Humidifier: [x] Heated ()        [x] Water chamber replacement ()/ 1 per 6 months        Mask:   [x] Nasal () /1 per 3 months [x] Full Face () /1 per 3 months   [x] Patient choice -Size and fit mask [x] Patient Choice - Size and fit mask   [] Dispense:  [] Dispense:    [x] Headgear () / 1 per 3 months [x] Headgear () / 1 per 3 months   [x] Replacement Nasal Cushion ()/2 per month [x] Interface Replacement ()/1 per month   [x] Replacement Nasal Pillows ()/2 per month         Tubing: [x] Heated ()/1 per 3 months    [] Standard ()/1 per 3 months [] Other:           Filters: [] Non-disposable ()/1 per 6 months     [] Ultra-Fine, Disposable ()/2 per month        Miscellaneous: [] Chin Strap ()/ 1 per 6 months [] O2 bleed-in:       LPM   [] Oximetry on CPAP/Bilevel []  Other:          Start Order Date: 20    MEDICAL JUSTIFICATION:  I, the undersigned, certify that the above prescribed supplies are medically necessary for this patients wellbeing. In my opinion, the supplies are both reasonable and necessary in reference to accepted standards of medicalpractice in treatment of this patients condition.     Clifford Bautista MD      NPI: [id number]       Order

## 2020-05-29 NOTE — TELEPHONE ENCOUNTER
Patient called in requesting order for cpap to be sent to [facility] [facility]. I have an order pended for Dr. Juaquin Peralta.

## 2020-06-02 ENCOUNTER — TELEPHONE (OUTPATIENT)
Dept: PULMONOLOGY | Age: 55
End: 2020-06-02

## 2020-06-02 NOTE — TELEPHONE ENCOUNTER
Patient was called back left message that order was sent to [address] 5/29/20 & after it goes to [facility] [address] they will process thru her insurance . I told her that once it is approved [facility] [facility] will call her for a set up.

## 2020-06-02 NOTE — TELEPHONE ENCOUNTER
Pt called back requesting a stauts on when Idania Dorian will send her orders to [facility] for her CPAP.  Pt would like call back when order has been sent

## 2020-06-17 RX ORDER — SUMATRIPTAN 100 MG/1
TABLET, FILM COATED ORAL
Qty: 27 TABLET | Refills: 0 | Status: SHIPPED | OUTPATIENT
Start: 2020-06-17 | End: 2020-07-21

## 2020-07-01 ENCOUNTER — TELEPHONE (OUTPATIENT)
Dept: FAMILY MEDICINE CLINIC | Age: 55
End: 2020-07-01

## 2020-07-01 NOTE — TELEPHONE ENCOUNTER
FMLA Paperwork from [employer]. Scanned and is in Dr Molly Nice folder to complete and sign.

## 2020-07-10 ENCOUNTER — TELEPHONE (OUTPATIENT)
Dept: PULMONOLOGY | Age: 55
End: 2020-07-10

## 2020-07-21 RX ORDER — SUMATRIPTAN 100 MG/1
TABLET, FILM COATED ORAL
Qty: 27 TABLET | Refills: 0 | Status: SHIPPED | OUTPATIENT
Start: 2020-07-21 | End: 2020-08-21 | Stop reason: SDUPTHER

## 2020-08-21 RX ORDER — SUMATRIPTAN 100 MG/1
TABLET, FILM COATED ORAL
Qty: 27 TABLET | Refills: 0 | Status: SHIPPED | OUTPATIENT
Start: 2020-08-21 | End: 2020-10-02

## 2020-08-21 NOTE — TELEPHONE ENCOUNTER
Medication and Quantity requested: SUMAtriptan (IMITREX) 100 MG tablet    Quantity 27     Last Visit  8/7/19    Pharmacy and phone number updated in [facility]:  Yes  [facility]

## 2020-08-21 NOTE — TELEPHONE ENCOUNTER
Medication:   Requested Prescriptions     Pending Prescriptions Disp Refills    SUMAtriptan (IMITREX) 100 MG tablet 27 tablet 0     Sig: TAKE 1 TABLET BY MOUTH AT ONSET OF HEADACHE. MAY REPEAT 1 TIME IN 2 HOURS AS NEEDED. DO NOT EXCEED 2 TABLETS IN 24 HOURS        Last Filled:  7/21/20 27 tabs 0 refills     Patient Phone Number: [phone] (home) [phone] (work)    Last appt: 8/7/2019   Next appt: Visit date not found    Last OARRS: No flowsheet data found.

## 2020-10-02 ENCOUNTER — OFFICE VISIT (OUTPATIENT)
Dept: SLEEP MEDICINE | Age: 55
End: 2020-10-02
Payer: COMMERCIAL

## 2020-10-02 VITALS
HEIGHT: 63 IN | HEART RATE: 75 BPM | TEMPERATURE: 98.4 F | OXYGEN SATURATION: 98 % | DIASTOLIC BLOOD PRESSURE: 86 MMHG | BODY MASS INDEX: 28.35 KG/M2 | WEIGHT: 160 LBS | SYSTOLIC BLOOD PRESSURE: 138 MMHG | RESPIRATION RATE: 16 BRPM

## 2020-10-02 PROCEDURE — 99213 OFFICE O/P EST LOW 20 MIN: CPT | Performed by: PSYCHIATRY & NEUROLOGY

## 2020-10-02 RX ORDER — SUMATRIPTAN 100 MG/1
TABLET, FILM COATED ORAL
Qty: 27 TABLET | Refills: 0 | Status: SHIPPED | OUTPATIENT
Start: 2020-10-02 | End: 2020-10-12 | Stop reason: SDUPTHER

## 2020-10-02 ASSESSMENT — SLEEP AND FATIGUE QUESTIONNAIRES
HOW LIKELY ARE YOU TO NOD OFF OR FALL ASLEEP WHILE LYING DOWN TO REST IN THE AFTERNOON WHEN CIRCUMSTANCES PERMIT: 0
ESS TOTAL SCORE: 0
HOW LIKELY ARE YOU TO NOD OFF OR FALL ASLEEP WHILE WATCHING TV: 0
HOW LIKELY ARE YOU TO NOD OFF OR FALL ASLEEP WHEN YOU ARE A PASSENGER IN A CAR FOR AN HOUR WITHOUT A BREAK: 0
HOW LIKELY ARE YOU TO NOD OFF OR FALL ASLEEP WHILE SITTING INACTIVE IN A PUBLIC PLACE: 0
HOW LIKELY ARE YOU TO NOD OFF OR FALL ASLEEP WHILE SITTING AND READING: 0
HOW LIKELY ARE YOU TO NOD OFF OR FALL ASLEEP WHILE SITTING QUIETLY AFTER LUNCH WITHOUT ALCOHOL: 0
HOW LIKELY ARE YOU TO NOD OFF OR FALL ASLEEP IN A CAR, WHILE STOPPED FOR A FEW MINUTES IN TRAFFIC: 0
HOW LIKELY ARE YOU TO NOD OFF OR FALL ASLEEP WHILE SITTING AND TALKING TO SOMEONE: 0

## 2020-10-02 ASSESSMENT — ENCOUNTER SYMPTOMS
APNEA: 0
CHOKING: 0

## 2020-10-02 NOTE — PATIENT INSTRUCTIONS
Patient Education        Learning About CPAP for Sleep Apnea  What is CPAP? CPAP is a small machine that you use at home every night while you sleep. It increases air pressure in your throat to keep your airway open. When you have sleep apnea, this can help you sleep better so you feel much better. CPAP stands for \"continuous positive airway pressure. \"  The CPAP machine will have one of the following:  · A mask that covers your nose and mouth  · Prongs that fit into your nose  · A mask that covers your nose only, the most common type. This type is called NCPAP. The N stands for \"nasal.\"  Why is it done? CPAP is usually the best treatment for obstructive sleep apnea. It is the first treatment choice and the most widely used. Your doctor may suggest CPAP if you have:  · Moderate to severe sleep apnea. · Sleep apnea and coronary artery disease (CAD). · Sleep apnea and heart failure. How does it help? · CPAP can help you have more normal sleep, so you feel less sleepy and more alert during the daytime. · CPAP may help keep heart failure or other heart problems from getting worse. · CPAP may help lower your blood pressure. · If you use CPAP, your bed partner may also sleep better because you are not snoring or restless. What are the side effects? Some people who use CPAP have:  · A dry or stuffy nose and a sore throat. · Irritated skin on the face. · Sore eyes. · Bloating. If you have any of these problems, work with your doctor to fix them. Here are some things you can try:  · Be sure the mask or nasal prongs fit well. · See if your doctor can adjust the pressure of your CPAP. · If your nose is dry, try a humidifier. · If your nose is runny or stuffy, try decongestant medicine or a steroid nasal spray. Be safe with medicines. Read and follow all instructions on the label. Do not use the medicine longer than the label says. If these things do not help, you might try a different type of machine. Some machines have air pressure that adjusts on its own. Others have air pressures that are different when you breathe in than when you breathe out. This may reduce discomfort caused by too much pressure in your nose. Where can you learn more? Go to https://chjerzyeb.[employer] org and sign in to your [employer] account. Enter L338 in the [employer] box to learn more about \"Learning About CPAP for Sleep Apnea. \"     If you do not have an account, please click on the \"Sign Up Now\" link. Current as of: February 24, 2020               Content Version: 12.5  © 2006-2020 Healthwise, Incorporated. Care instructions adapted under license by [facility] ([facility]). If you have questions about a medical condition or this instruction, always ask your healthcare professional. [address] any warranty or liability for your use of this information.

## 2020-10-02 NOTE — TELEPHONE ENCOUNTER
Medication:   Requested Prescriptions     Pending Prescriptions Disp Refills    SUMAtriptan (IMITREX) 100 MG tablet [Pharmacy Med Name: SUMATRIPTAN 100MG TABLETS] 27 tablet 0     Sig: TAKE 1 TABLET BY MOUTH AT ONSET OF HEADACHE. MAY REPEAT 1 TIME IN 2 HOURS AS NEEDED. DO NOT EXCEED 2 TABLETS IN 24 HOURS        Last Filled:  8/21/2020 27 tabs 0 refills     Patient Phone Number: [phone] (home) [phone] (work)    Last appt: 8/7/2019   Next appt: Visit date not found    Last OARRS: No flowsheet data found.

## 2020-10-02 NOTE — PROGRESS NOTES
MD MANDIE Wilhelm Board Certified in Sleep Medicine  Certified in [address] [place] [address] Certified in Neurology Theodore Hernandezjonny Ackerman Siobhan [phone],  Vicente Segovia[phone]   [address] [place] [address], [address] [place] [address]  [phone]    Subjective:     Patient ID: Dawn Gomez is a 60 y.o. female. Chief Complaint   Patient presents with    Sleep Apnea     cpap f/u       HPI:        Dawn Gomez is a 60 y.o. female was seen today as a follow for mild obstructive sleep apnea with apnea hypopnea index of 5.0h with lowest O2 saturation of 91%, patient spent about 0 minutes below 90%. Patient is using the PAP machine about 70% of the time, more than 4 hours a nightabout  67 %, in total average of 6:15 hours a night in last 30 days. Currently on PAP at 10.2 cm (5-15), the AHI is only 1.2 events per hour at this pressure. Patient improved regarding daytime sleepiness and fatigue, wakes up refreshed in the morning. The Patient scored Total score: 0 on [place] Sleepiness Scale ( more than 10 is indicative of daytime sleepiness)   Uses nasal pillow mask.     DOT/CDL - N/A        Previous Report(s)Reviewed: historical medical records         Social History     Socioeconomic History    Marital status:      Spouse name: Not on file    Number of children: Not on file    Years of education: Not on file    Highest education level: Not on file   Occupational History    Not on file   Social Needs    Financial resource strain: Not on file    Food insecurity     Worry: Not on file     Inability: Not on file    Transportation needs     Medical: Not on file     Non-medical: Not on file   Tobacco Use    Smoking status: Never Smoker    Smokeless tobacco: Never Used Substance and Sexual Activity    Alcohol use: No    Drug use: No    Sexual activity: Not on file   Lifestyle    Physical activity     Days per week: Not on file     Minutes per session: Not on file    Stress: Not on file   Relationships    Social connections     Talks on phone: Not on file     Gets together: Not on file     Attends Mu-ism service: Not on file     Active member of club or organization: Not on file     Attends meetings of clubs or organizations: Not on file     Relationship status: Not on file    Intimate partner violence     Fear of current or ex partner: Not on file     Emotionally abused: Not on file     Physically abused: Not on file     Forced sexual activity: Not on file   Other Topics Concern    Not on file   Social History Narrative    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        Daughter James Baker       Prior to Admission medications    Medication Sig Start Date End Date Taking? Authorizing Provider   SUMAtriptan (IMITREX) 100 MG tablet TAKE 1 TABLET BY MOUTH AT ONSET OF HEADACHE. MAY REPEAT 1 TIME IN 2 HOURS AS NEEDED. DO NOT EXCEED 2 TABLETS IN 24 HOURS 10/2/20  Yes Bessy Gandara MD   SUMAtriptan Succinate 6 MG/0.5ML SOAJ INJECT 0.5ML UNDER THE SKIN AS NEEDED FOR MIGRAINE. MAY REPEAT ONCE AFTER 1 HOUR.  DO NOT EXCEED 2 DOSES IN 24 HOURS 20  Yes Al Basurto MD   acetaminophen (TYLENOL) 500 MG tablet Take 500 mg by mouth every 6 hours as needed for Pain   Yes Historical Provider, MD   loperamide (IMODIUM) 2 MG capsule Take 2 mg by mouth 4 times daily as needed for Diarrhea   Yes Historical Provider, MD   [address] Take by mouth daily   Yes Historical Provider, MD   fesoterodine (TOVIAZ) 4 MG TB24 ER tablet Take 4 mg by mouth daily   Yes Historical Provider, MD   Cyanocobalamin (B-12) 1000 MCG CAPS Take by mouth daily    Yes Historical Provider, MD   Cholecalciferol (VITAMIN D3) 5000 units TABS Take by mouth daily    Yes Historical Provider, MD   fluticasone (FLONASE) 50 MCG/ACT nasal spray 2 sprays each nostril daily 1/10/17  Yes Frnacisco Schultz MD   Aspirin-Acetaminophen-Caffeine (EXCEDRIN PO) Take  by mouth. Yes Historical Provider, MD       Allergies as of 10/02/2020 - Review Complete 10/02/2020   Allergen Reaction Noted    Sulfa antibiotics Hives 02/05/2019    Ciprofloxacin Other (See Comments) 11/15/2018       Patient Active Problem List   Diagnosis    Allergic rhinitis due to dust    Intractable chronic migraine without aura and with status migrainosus    Overactive bladder    Solar lentiginosis    Multiple benign melanocytic nevi    Mild obstructive sleep apnea       Past Medical History:   Diagnosis Date    Allergic rhinitis due to dust 1/11/2017    Chronic migraine 1/10/2017    Frequent UTI     Mild obstructive sleep apnea     Overactive bladder     Vitamin D deficiency        Past Surgical History:   Procedure Laterality Date    CHOLECYSTECTOMY      COLONOSCOPY N/A 6/14/2019    COLONOSCOPY performed by Joannie Osler, MD at [address]         Family History   Problem Relation Age of Onset    Heart Disease Mother     High Blood Pressure Mother     Hypertension Mother     Diabetes Father     Heart Disease Father     Hypertension Father     Migraines Other     Stroke Other     Migraines Other     Dementia Other     Diabetes Other     Hypertension Other     Dementia Other     Alcohol Abuse Other     Cancer Other        Review of Systems   Constitutional: Negative for fatigue. Respiratory: Negative for apnea and choking. Cardiovascular: Negative for leg swelling. Genitourinary: Negative for frequency. Neurological: Headaches: improved over 50%       Objective:     Vitals:  Weight BMI Neck circumference    Wt Readings from Last 3 Encounters:   10/02/20 160 lb (72.6 kg)   02/14/20 158 lb (71.7 kg)   11/01/19 157 lb (71.2 kg)    Body mass index is 28.34 kg/m².        BP HR SaO2

## 2020-10-12 RX ORDER — SUMATRIPTAN 100 MG/1
TABLET, FILM COATED ORAL
Qty: 27 TABLET | Refills: 0 | Status: SHIPPED | OUTPATIENT
Start: 2020-10-12 | End: 2020-12-02 | Stop reason: SDUPTHER

## 2020-10-12 NOTE — TELEPHONE ENCOUNTER
Medication and Quantity requested: SUMAtriptan (IMITREX) 100 MG tablet      Quantity 27     Last Visit  8/7/19    Pharmacy and phone number updated in [facility]:  Yes [facility]

## 2020-10-29 RX ORDER — CEFUROXIME AXETIL 250 MG/1
TABLET ORAL
Qty: 3 ML | Refills: 3 | Status: SHIPPED | OUTPATIENT
Start: 2020-10-29 | End: 2021-08-16

## 2020-12-02 ENCOUNTER — TELEPHONE (OUTPATIENT)
Dept: SURGERY | Age: 55
End: 2020-12-02

## 2020-12-02 RX ORDER — SUMATRIPTAN 100 MG/1
TABLET, FILM COATED ORAL
Qty: 27 TABLET | Refills: 0 | Status: SHIPPED | OUTPATIENT
Start: 2020-12-02 | End: 2021-01-18 | Stop reason: SDUPTHER

## 2020-12-02 NOTE — TELEPHONE ENCOUNTER
Medication and Quantity requested: SUMAtriptan (IMITREX) 100 MG tablet    Quantity 18     Last Visit  8/7/19    Pharmacy and phone number updated in [facility]:  Yes [address] [place] [address]/[facility]

## 2021-01-04 ENCOUNTER — TELEPHONE (OUTPATIENT)
Dept: FAMILY MEDICINE CLINIC | Age: 56
End: 2021-01-04

## 2021-01-11 ENCOUNTER — VIRTUAL VISIT (OUTPATIENT)
Dept: FAMILY MEDICINE CLINIC | Age: 56
End: 2021-01-11
Payer: COMMERCIAL

## 2021-01-11 DIAGNOSIS — G47.33 MILD OBSTRUCTIVE SLEEP APNEA: ICD-10-CM

## 2021-01-11 DIAGNOSIS — G43.711 INTRACTABLE CHRONIC MIGRAINE WITHOUT AURA AND WITH STATUS MIGRAINOSUS: Primary | ICD-10-CM

## 2021-01-11 DIAGNOSIS — Z86.16 HISTORY OF COVID-19: ICD-10-CM

## 2021-01-11 PROCEDURE — 99214 OFFICE O/P EST MOD 30 MIN: CPT | Performed by: FAMILY MEDICINE

## 2021-01-11 ASSESSMENT — PATIENT HEALTH QUESTIONNAIRE - PHQ9
1. LITTLE INTEREST OR PLEASURE IN DOING THINGS: 0
SUM OF ALL RESPONSES TO PHQ QUESTIONS 1-9: 0
SUM OF ALL RESPONSES TO PHQ9 QUESTIONS 1 & 2: 0

## 2021-01-11 NOTE — PROGRESS NOTES
2021    TELEHEALTH EVALUATION -- Audio/Visual (During RQSDF-88 public health emergency)    HPI:    Manuel Singletary (:  1965) has requested an audio/video evaluation for the following concern(s):    F/u migraines- getting them a few time per month. Is using CPAP for THO which helps with headaches sometimes. Taking imitrex or excedrin PRN. trying to stop using excedrin so much. sometimes needs imitrex for 4 days in a row but then has no migraine for about a week afterwards. Needs [facility] paperwork renewed. Had covid19 in early December. Had bad headaches, nasal congestion, fatigue, loss of taste and smell. Tried imitrex, didn't help. Symptoms lasted over a week. Still has some dry cough. Has taken mucinex DM which has helped some. No shortness of breath. Fatigue has improved. Daneil Pace about getting QTSLN87 vaccination at this time. Review of Systems:  Gen:  Denies fever, chills, headaches. No weight loss  HEENT:  Denies cold symptoms, sore throat. CV:  Denies chest pain or tightness, palpitations. Pulm:  Denies shortness of breath, cough. Abd:  Denies abdominal pain, change in bowel habits. Prior to Visit Medications    Medication Sig Taking? Authorizing Provider   SUMAtriptan (IMITREX) 100 MG tablet TAKE 1 TABLET BY MOUTH AT ONSET OF HEADACHE. MAY REPEAT 1 TIME IN 2 HOURS AS NEEDED. DO NOT EXCEED 2 TABLETS IN 24 HOURS Yes Herbert Manzo MD   SUMAtriptan Succinate 6 MG/0.5ML SOAJ INJECT 0.5ML UNDER THE SKIN AS NEEDED FOR MIGRAINE. MAY REPEAT ONCE AFTER 1 HOUR.  DO NOT EXCEED 2 DOSES IN 24 HOURS Yes Herbert Manzo MD   acetaminophen (TYLENOL) 500 MG tablet Take 500 mg by mouth every 6 hours as needed for Pain Yes Historical Provider, MD   loperamide (IMODIUM) 2 MG capsule Take 2 mg by mouth 4 times daily as needed for Diarrhea Yes Historical Provider, MD   [address] Take by mouth daily Yes Historical Provider, MD Constitutional: Appears well-developed and well-nourished. No apparent distress    Mental status: Alert and awake. Oriented to person/place/time. Able to follow commands    Eyes: EOM normal. Sclera normal. No discharge visible  HENT: Normocephalic, atraumatic. Mouth/Throat: Mucous membranes are moist. External Ears Normal    Neck: No visualized mass   Pulmonary/Chest: Respiratory effort normal.  No visualized signs of difficulty breathing or respiratory distress        Musculoskeletal:  Normal range of motion of neck  Neurological:       No Facial Asymmetry (Cranial nerve 7 motor function) (limited exam to video visit). No gaze palsy       Skin:  No significant exanthematous lesions or discoloration noted on facial skin       Psychiatric: Normal Affect. No Hallucinations            ASSESSMENT/PLAN:  1. Intractable chronic migraine without aura and with status migrainosus  Stable  Continue CPAP  imitrex PRN  FMLA forms renewed    2. Mild obstructive sleep apnea  stable    3. History of COVID-19  Symptoms greatly improved, still slight cough  Recommend getting vaccination at this time, pt prefers to wait a few weeks      No follow-ups on file. Teresa Conner is a 60 y.o. female being evaluated by a Virtual Visit (video visit) encounter to address concerns as mentioned above. A caregiver was present when appropriate. Due to this being a TeleHealth encounter (During YDNUW-97 public health emergency), evaluation of the following organ systems was limited: Vitals/Constitutional/EENT/Resp/CV/GI//MS/Neuro/Skin/Heme-Lymph-Imm. Pursuant to the emergency declaration under the [address] [place] [address] and the [employer] Act, this Virtual Visit was conducted with patient's (and/or legal guardian's) consent, to reduce the patient's risk of exposure to COVID-19 and provide necessary medical care. The patient (and/or legal guardian) has also been advised to contact this office for worsening conditions or problems, and seek emergency medical treatment and/or call 911 if deemed necessary. Patient identification was verified at the start of the visit: Yes    Total time spent on this encounter: 20 minutes. Services were provided through a video synchronous discussion virtually to substitute for in-person clinic visit. Patient was located in their home. Provider was located in the office. --Sylvia Ashley MD on 1/11/2021 at 5:21 PM    An electronic signature was used to authenticate this note. Martha Chatterjee

## 2021-01-18 DIAGNOSIS — G43.711 INTRACTABLE CHRONIC MIGRAINE WITHOUT AURA AND WITH STATUS MIGRAINOSUS: ICD-10-CM

## 2021-01-18 RX ORDER — SUMATRIPTAN 100 MG/1
TABLET, FILM COATED ORAL
Qty: 30 TABLET | Refills: 0 | Status: SHIPPED | OUTPATIENT
Start: 2021-01-18 | End: 2021-03-24 | Stop reason: SDUPTHER

## 2021-01-18 NOTE — TELEPHONE ENCOUNTER
Medication and Quantity requested: SUMAtriptan (IMITREX) 100 MG tablet      Quantity 27     Last Visit  1/11/21    Pharmacy and phone number updated in [facility]:  Yes [facility]

## 2021-01-18 NOTE — TELEPHONE ENCOUNTER
Medication:   Requested Prescriptions     Pending Prescriptions Disp Refills    SUMAtriptan (IMITREX) 100 MG tablet 27 tablet 0     Sig: TAKE 1 TABLET BY MOUTH AT ONSET OF HEADACHE. MAY REPEAT 1 TIME IN 2 HOURS AS NEEDED. DO NOT EXCEED 2 TABLETS IN 24 HOURS        Last Filled:  12/2/2020 #30 Refills 0    Patient Phone Number: [phone] (home) [phone] (work)    Last appt: 1/11/2021   Next appt: 3/1/2021    Last OARRS: No flowsheet data found.

## 2021-02-06 ENCOUNTER — HOSPITAL ENCOUNTER (OUTPATIENT)
Dept: MAMMOGRAPHY | Age: 56
Discharge: HOME | End: 2021-02-06
Payer: COMMERCIAL

## 2021-02-06 DIAGNOSIS — Z12.31 VISIT FOR SCREENING MAMMOGRAM: ICD-10-CM

## 2021-02-06 PROCEDURE — 77063 BREAST TOMOSYNTHESIS BI: CPT

## 2021-02-17 ENCOUNTER — OFFICE VISIT (OUTPATIENT)
Dept: SURGERY | Age: 56
End: 2021-02-17
Payer: COMMERCIAL

## 2021-02-17 VITALS
SYSTOLIC BLOOD PRESSURE: 128 MMHG | RESPIRATION RATE: 18 BRPM | BODY MASS INDEX: 27.82 KG/M2 | DIASTOLIC BLOOD PRESSURE: 68 MMHG | TEMPERATURE: 97.3 F | OXYGEN SATURATION: 99 % | HEART RATE: 80 BPM | WEIGHT: 157 LBS | HEIGHT: 63 IN

## 2021-02-17 DIAGNOSIS — N60.11 FIBROCYSTIC BREAST CHANGES OF BOTH BREASTS: ICD-10-CM

## 2021-02-17 DIAGNOSIS — N60.12 FIBROCYSTIC BREAST CHANGES OF BOTH BREASTS: ICD-10-CM

## 2021-02-17 DIAGNOSIS — R92.2 DENSE BREAST TISSUE: Primary | ICD-10-CM

## 2021-02-17 DIAGNOSIS — Z91.89 AT HIGH RISK FOR BREAST CANCER: ICD-10-CM

## 2021-02-17 PROCEDURE — 99204 OFFICE O/P NEW MOD 45 MIN: CPT | Performed by: SURGERY

## 2021-02-17 ASSESSMENT — ENCOUNTER SYMPTOMS
ABDOMINAL PAIN: 0
SHORTNESS OF BREATH: 0
COUGH: 0

## 2021-02-17 NOTE — PROGRESS NOTES
21    CHIEF COMPLAINT:  Dense breast tissue, fibrocystic breast disease    HISTORY OF PRESENT ILLNESS:  Ginger Mills is a 60 y.o. woman who requested that I evaluate her due to her history of fibrocystic breast disease and dense breast tissue. She has previously followed with Dr. Sean Marie, however has not seen a breast surgeon over the last several years. She does get regular mammograms. She reports a history of multiple cyst aspirations by Dr. Champ Reeder in the past.  As she has gone through menopause these cysts have become much less bothersome to her. She presents today to establish care. The patient states that she herself has not noticed any new abnormal masses in either breast, although she states her self breast exam is very difficult due to her lumpy breast tissue at baseline. She denies any change in the appearance of her breasts or the skin of her breasts. She denies bilateral nipple discharge. She has no other systemic complaints and otherwise feels well today.      Pertinent family history: Paternal aunt with breast cancer diagnosed in her late 62s    GYNECOLOGIC HISTORY:  Menarche at age 13    She delivered her first child at age 30, and did not breastfeed  Postmenopausal at age 45  No hysterectomy  Oral contraceptive use: no and is not currently taking  Hormone use: no and is not currently taking    Past Medical History:   Diagnosis Date    Allergic rhinitis due to dust 2017    Chronic migraine 1/10/2017    Frequent UTI     Mild obstructive sleep apnea     Overactive bladder     Vitamin D deficiency      Past Surgical History:   Procedure Laterality Date    CHOLECYSTECTOMY      COLONOSCOPY N/A 2019    [address]       Current Outpatient Medications   Medication Sig Dispense Refill  SUMAtriptan (IMITREX) 100 MG tablet TAKE 1 TABLET BY MOUTH AT ONSET OF HEADACHE. MAY REPEAT 1 TIME IN 2 HOURS AS NEEDED. DO NOT EXCEED 2 TABLETS IN 24 HOURS 30 tablet 0    SUMAtriptan Succinate 6 MG/0.5ML SOAJ INJECT 0.5ML UNDER THE SKIN AS NEEDED FOR MIGRAINE. MAY REPEAT ONCE AFTER 1 HOUR. DO NOT EXCEED 2 DOSES IN 24 HOURS 3 mL 3    acetaminophen (TYLENOL) 500 MG tablet Take 500 mg by mouth every 6 hours as needed for Pain      loperamide (IMODIUM) 2 MG capsule Take 2 mg by mouth 4 times daily as needed for Diarrhea      FIBER ADULT GUMMIES PO Take by mouth daily      fesoterodine (TOVIAZ) 4 MG TB24 ER tablet Take 4 mg by mouth daily      Cyanocobalamin (B-12) 1000 MCG CAPS Take by mouth daily       Cholecalciferol (VITAMIN D3) 5000 units TABS Take by mouth daily       fluticasone (FLONASE) 50 MCG/ACT nasal spray 2 sprays each nostril daily 1 Bottle 5    Aspirin-Acetaminophen-Caffeine (EXCEDRIN PO) Take  by mouth. No current facility-administered medications for this visit. Allergies   Allergen Reactions    Sulfa Antibiotics Hives     Pt has taken this previously with no complications, states she had hives the last time she took Bactrim.     Ciprofloxacin Other (See Comments)     Muscle aches      Social History     Socioeconomic History    Marital status:      Spouse name: Not on file    Number of children: Not on file    Years of education: Not on file    Highest education level: Not on file   Occupational History    Not on file   Social Needs    Financial resource strain: Not on file    Food insecurity     Worry: Not on file     Inability: Not on file    Transportation needs     Medical: Not on file     Non-medical: Not on file   Tobacco Use    Smoking status: Never Smoker    Smokeless tobacco: Never Used   Substance and Sexual Activity    Alcohol use: No    Drug use: No    Sexual activity: Not on file   Lifestyle    Physical activity     Days per week: Not on file Minutes per session: Not on file    Stress: Not on file   Relationships    Social connections     Talks on phone: Not on file     Gets together: Not on file     Attends Worship service: Not on file     Active member of club or organization: Not on file     Attends meetings of clubs or organizations: Not on file     Relationship status: Not on file    Intimate partner violence     Fear of current or ex partner: Not on file     Emotionally abused: Not on file     Physically abused: Not on file     Forced sexual activity: Not on file   Other Topics Concern    Not on file   Social History Narrative    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        Daughter Alberto Avila     Family History   Problem Relation Age of Onset    Heart Disease Mother     High Blood Pressure Mother     Hypertension Mother     Diabetes Father     Heart Disease Father     Hypertension Father     Migraines Other     Stroke Other     Migraines Other     Dementia Other     Diabetes Other     Hypertension Other     Dementia Other     Alcohol Abuse Other     Cancer Other      REVIEW OF SYSTEMS:  Constitutional: Negative for unexpected weight change. Eyes: Negative for visual disturbance. Respiratory: Negative for cough and shortness of breath. Cardiovascular: Negative for chest pain and palpitations. Gastrointestinal: Negative for abdominal pain. Musculoskeletal: Negative for arthralgias and myalgias. Neurological: Positive for headaches (Miagraines). Hematological: Negative for adenopathy. Bruises/bleeds easily (ASA use). Psychiatric/Behavioral: Negative for dysphoric mood. The patient is not nervous/anxious    I personally reviewed and agree with the above ROS as documented by my medical assistant.     PHYSICAL EXAMINATION: Vitals: /68 (Site: Right Upper Arm, Position: Sitting, Cuff Size: Medium Adult)   Pulse 80   Temp 97.3 °F (36.3 °C) (Temporal)   Resp 18   Ht 5' 3\" (1.6 m)   Wt 157 lb (71.2 kg)   LMP 08/10/2016   SpO2 99%   BMI 27.81 kg/m²   General: Well-developed, well-nourished, in no apparent distress. Psychiatric: Alert and oriented x 3. Appropriate affect and behavior for today's visit. Musculoskeletal: Normal gait and range of motion in all 4 extremities. Lymphatic System:  No concerning cervical, supraclavicular or axillary lymphadenopathy. Breast Exam: Bilateral breast examination reveals ptotic breasts bilaterally. 36C. There are fibrocystic changes throughout both breasts. Right Breast: Examination of the right breast in the upright and supine positions reveal no obvious masses, skin changes, dimpling, or retraction. The nipple and areola are without erosion, edema or ulceration. There is no obvious nipple discharge. There is no concerning axillary adenopathy. Left Breast: Examination of the left breast in the upright and supine positions reveal no obvious masses, skin changes, dimpling, or retraction. The nipple and areola are without erosion, edema or ulceration. There is no obvious nipple discharge. There is no concerning axillary adenopathy. IMAGING: I personally reviewed the breast imaging performed from 2/6/2021 and discussed this with the patient. There are circumscribed nodules seen in both breasts which wax and wane over the previous studies consistent with a benign process. She was given a BI-RADS 2. Breast density is described as extremely dense breast tissue.     IMPRESSION/RECOMMENDATION: Bere Galloway is a 60 y.o. woman who presents today for consultation regarding her dense fibrocystic breast tissue. We discussed her elevated risk for future breast cancer based on her dense tissue. First of all, I reassured her that based upon her clinical examination and most recent imaging studies that there is no evidence of any underlying abnormalities that require further intervention or biopsy. We discussed her significant risk for future breast cancer and I did perform a formal risk assessment using the ELAINE Risk Assessment tool (Tyrer-Cuzick Model), version 8. Her 10 year risk of breast cancer is 7.8% (general population average 3.4%). Her lifetime risk for breast cancer is 21.5% (general population average 9.8%). A copy of this is scanned into her chart. Thus, she does meet high risk criteria (which is a lifetime risk of 20% or higher). We discussed our recommendations for increased surveillance, to include: annual screening mammography, annual screening MRI, and clinical breast exams every 6-12 months. We discussed the role of MRI scanning, its high sensitivity but also high false positive rate, and its importance as an adjunct in following patients at increased risk. We also discussed the use of IV gadolinium dye for breast MRIs and how gadolinium has been shown to deposit in brain tissue, but this is of unknown significance. In general, an MRI if it is performed, will be done on the alternate 6 months from the mammogram.  We also stressed the importance of breast awareness. Self breast evaluation was reviewed. Macey Lovett MD

## 2021-02-17 NOTE — PROGRESS NOTES
Gynecologic History  Menarche at age 13    She delivered her first child at age 30, and did not breastfeed  Postmenopausal at age 45  No hysterectomy  Oral contraceptive use: no and is not currently taking  Hormone use: no and is not currently taking    Bra Size: 36 C    Review of Systems   Constitutional: Negative for unexpected weight change. Eyes: Negative for visual disturbance. Respiratory: Negative for cough and shortness of breath. Cardiovascular: Negative for chest pain and palpitations. Gastrointestinal: Negative for abdominal pain. Musculoskeletal: Negative for arthralgias and myalgias. Neurological: Positive for headaches (Miagraines). Hematological: Negative for adenopathy. Bruises/bleeds easily (ASA use). Psychiatric/Behavioral: Negative for dysphoric mood. The patient is not nervous/anxious.

## 2021-02-23 ENCOUNTER — TELEPHONE (OUTPATIENT)
Dept: FAMILY MEDICINE CLINIC | Age: 56
End: 2021-02-23

## 2021-02-23 DIAGNOSIS — Z00.00 ANNUAL PHYSICAL EXAM: Primary | ICD-10-CM

## 2021-03-20 ENCOUNTER — HOSPITAL ENCOUNTER (OUTPATIENT)
Age: 56
Discharge: HOME | End: 2021-03-20
Payer: COMMERCIAL

## 2021-03-20 DIAGNOSIS — Z00.00 ANNUAL PHYSICAL EXAM: ICD-10-CM

## 2021-03-20 DIAGNOSIS — Z86.16 HISTORY OF COVID-19: ICD-10-CM

## 2021-03-20 DIAGNOSIS — E55.9 VITAMIN D DEFICIENCY: ICD-10-CM

## 2021-03-20 DIAGNOSIS — G43.711 INTRACTABLE CHRONIC MIGRAINE WITHOUT AURA AND WITH STATUS MIGRAINOSUS: ICD-10-CM

## 2021-03-20 LAB
A/G RATIO: 1.4 (ref 1.1–2.2)
ALBUMIN SERPL-MCNC: 4.2 G/DL (ref 3.4–5)
ALP BLD-CCNC: 65 U/L (ref 40–129)
ALT SERPL-CCNC: 11 U/L (ref 10–40)
ANION GAP SERPL CALCULATED.3IONS-SCNC: 9 MMOL/L (ref 3–16)
AST SERPL-CCNC: 16 U/L (ref 15–37)
BASOPHILS ABSOLUTE: 0 K/UL (ref 0–0.2)
BASOPHILS RELATIVE PERCENT: 0.7 %
BILIRUB SERPL-MCNC: 0.3 MG/DL (ref 0–1)
BUN BLDV-MCNC: 18 MG/DL (ref 7–20)
CALCIUM SERPL-MCNC: 9 MG/DL (ref 8.3–10.6)
CHLORIDE BLD-SCNC: 105 MMOL/L (ref 99–110)
CHOLESTEROL, TOTAL: 177 MG/DL (ref 0–199)
CO2: 26 MMOL/L (ref 21–32)
CREAT SERPL-MCNC: 0.6 MG/DL (ref 0.6–1.1)
EOSINOPHILS ABSOLUTE: 0.1 K/UL (ref 0–0.6)
EOSINOPHILS RELATIVE PERCENT: 2.9 %
GFR AFRICAN AMERICAN: >60
GFR NON-AFRICAN AMERICAN: >60
GLOBULIN: 3.1 G/DL
GLUCOSE BLD-MCNC: 88 MG/DL (ref 70–99)
HCT VFR BLD CALC: 38.3 % (ref 36–48)
HDLC SERPL-MCNC: 76 MG/DL (ref 40–60)
HEMOGLOBIN: 13.4 G/DL (ref 12–16)
LDL CHOLESTEROL CALCULATED: 92 MG/DL
LYMPHOCYTES ABSOLUTE: 1.1 K/UL (ref 1–5.1)
LYMPHOCYTES RELATIVE PERCENT: 29.8 %
MCH RBC QN AUTO: 31.6 PG (ref 26–34)
MCHC RBC AUTO-ENTMCNC: 35 G/DL (ref 31–36)
MCV RBC AUTO: 90.5 FL (ref 80–100)
MONOCYTES ABSOLUTE: 0.3 K/UL (ref 0–1.3)
MONOCYTES RELATIVE PERCENT: 8.8 %
NEUTROPHILS ABSOLUTE: 2.1 K/UL (ref 1.7–7.7)
NEUTROPHILS RELATIVE PERCENT: 57.8 %
PDW BLD-RTO: 13.4 % (ref 12.4–15.4)
PLATELET # BLD: 209 K/UL (ref 135–450)
PMV BLD AUTO: 7.5 FL (ref 5–10.5)
POTASSIUM SERPL-SCNC: 3.9 MMOL/L (ref 3.5–5.1)
RBC # BLD: 4.23 M/UL (ref 4–5.2)
SARS-COV-2 ANTIBODY, TOTAL: POSITIVE
SODIUM BLD-SCNC: 140 MMOL/L (ref 136–145)
TOTAL PROTEIN: 7.3 G/DL (ref 6.4–8.2)
TRIGL SERPL-MCNC: 47 MG/DL (ref 0–150)
TSH SERPL DL<=0.05 MIU/L-ACNC: 2.27 UIU/ML (ref 0.27–4.2)
VITAMIN B-12: 1173 PG/ML (ref 211–911)
VITAMIN D 25-HYDROXY: 50.7 NG/ML
VLDLC SERPL CALC-MCNC: 9 MG/DL
WBC # BLD: 3.6 K/UL (ref 4–11)

## 2021-03-20 PROCEDURE — 36415 COLL VENOUS BLD VENIPUNCTURE: CPT

## 2021-03-20 PROCEDURE — 82607 VITAMIN B-12: CPT

## 2021-03-20 PROCEDURE — 85025 COMPLETE CBC W/AUTO DIFF WBC: CPT

## 2021-03-20 PROCEDURE — 84443 ASSAY THYROID STIM HORMONE: CPT

## 2021-03-20 PROCEDURE — 82306 VITAMIN D 25 HYDROXY: CPT

## 2021-03-20 PROCEDURE — 80061 LIPID PANEL: CPT

## 2021-03-20 PROCEDURE — 86769 SARS-COV-2 COVID-19 ANTIBODY: CPT

## 2021-03-20 PROCEDURE — 80053 COMPREHEN METABOLIC PANEL: CPT

## 2021-03-24 ENCOUNTER — OFFICE VISIT (OUTPATIENT)
Dept: FAMILY MEDICINE CLINIC | Age: 56
End: 2021-03-24
Payer: COMMERCIAL

## 2021-03-24 VITALS
HEART RATE: 82 BPM | DIASTOLIC BLOOD PRESSURE: 68 MMHG | HEIGHT: 63 IN | WEIGHT: 158 LBS | BODY MASS INDEX: 28 KG/M2 | OXYGEN SATURATION: 98 % | SYSTOLIC BLOOD PRESSURE: 112 MMHG

## 2021-03-24 DIAGNOSIS — G43.711 INTRACTABLE CHRONIC MIGRAINE WITHOUT AURA AND WITH STATUS MIGRAINOSUS: ICD-10-CM

## 2021-03-24 DIAGNOSIS — Z86.16 HISTORY OF 2019 NOVEL CORONAVIRUS DISEASE (COVID-19): ICD-10-CM

## 2021-03-24 DIAGNOSIS — Z00.00 ANNUAL PHYSICAL EXAM: Primary | ICD-10-CM

## 2021-03-24 PROCEDURE — 99396 PREV VISIT EST AGE 40-64: CPT | Performed by: FAMILY MEDICINE

## 2021-03-24 RX ORDER — SUMATRIPTAN 100 MG/1
TABLET, FILM COATED ORAL
Qty: 30 TABLET | Refills: 5 | Status: SHIPPED | OUTPATIENT
Start: 2021-03-24 | End: 2021-07-16 | Stop reason: SDUPTHER

## 2021-03-24 NOTE — PROGRESS NOTES
Chief Complaint:   Kanika Chaves is a 60 y.o. female who presents for complete physical examination. History of Present Illness:    Had covid infection in Dec 2020. wondering about getting vaccine. Symptoms have completely resolved. Past Medical History:   Diagnosis Date    Allergic rhinitis due to dust 1/11/2017    Chronic migraine 1/10/2017    Frequent UTI     Mild obstructive sleep apnea     Overactive bladder     Vitamin D deficiency        Past Surgical History:   Procedure Laterality Date    CHOLECYSTECTOMY      COLONOSCOPY N/A 6/14/2019    COLONOSCOPY performed by Abdelrahman Stacy MD at [address] [place] [address]         Outpatient Medications Marked as Taking for the 3/24/21 encounter (Office Visit) with Daphnie Alexandra MD   Medication Sig Dispense Refill    SUMAtriptan Succinate 6 MG/0.5ML SOAJ INJECT 0.5ML UNDER THE SKIN AS NEEDED FOR MIGRAINE. MAY REPEAT ONCE AFTER 1 HOUR. DO NOT EXCEED 2 DOSES IN 24 HOURS 3 mL 3    acetaminophen (TYLENOL) 500 MG tablet Take 500 mg by mouth every 6 hours as needed for Pain      loperamide (IMODIUM) 2 MG capsule Take 2 mg by mouth 4 times daily as needed for Diarrhea      FIBER ADULT GUMMIES PO Take by mouth daily      fesoterodine (TOVIAZ) 4 MG TB24 ER tablet Take 4 mg by mouth daily      Cyanocobalamin (B-12) 1000 MCG CAPS Take by mouth daily       Cholecalciferol (VITAMIN D3) 5000 units TABS Take by mouth daily       fluticasone (FLONASE) 50 MCG/ACT nasal spray 2 sprays each nostril daily 1 Bottle 5    Aspirin-Acetaminophen-Caffeine (EXCEDRIN PO) Take  by mouth. Allergies   Allergen Reactions    Sulfa Antibiotics Hives     Pt has taken this previously with no complications, states she had hives the last time she took Bactrim.     Ciprofloxacin Other (See Comments)     Muscle aches        Social History     Socioeconomic History    Marital status:      Spouse name: Not on file    Number of children: Not on file  Years of education: Not on file    Highest education level: Not on file   Occupational History    Not on file   Social Needs    Financial resource strain: Not on file    Food insecurity     Worry: Not on file     Inability: Not on file    Transportation needs     Medical: Not on file     Non-medical: Not on file   Tobacco Use    Smoking status: Never Smoker    Smokeless tobacco: Never Used   Substance and Sexual Activity    Alcohol use: No    Drug use: No    Sexual activity: Not on file   Lifestyle    Physical activity     Days per week: Not on file     Minutes per session: Not on file    Stress: Not on file   Relationships    Social connections     Talks on phone: Not on file     Gets together: Not on file     Attends Denominational service: Not on file     Active member of club or organization: Not on file     Attends meetings of clubs or organizations: Not on file     Relationship status: Not on file    Intimate partner violence     Fear of current or ex partner: Not on file     Emotionally abused: Not on file     Physically abused: Not on file     Forced sexual activity: Not on file   Other Topics Concern    Not on file   Social History Narrative    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        Daughter Yazoo [place]       Family History   Problem Relation Age of Onset    Heart Disease Mother     High Blood Pressure Mother     Hypertension Mother     Diabetes Father     Heart Disease Father     Hypertension Father     Migraines Other     Stroke Other     Migraines Other     Dementia Other     Diabetes Other     Hypertension Other     Dementia Other     Alcohol Abuse Other     Cancer Other          Health Maintenance   Topic Date Due    COVID-19 Vaccine (1) Never done    Shingles Vaccine (1 of 2) Never done    Cervical cancer screen  2020    Breast cancer screen  2023    DTaP/Tdap/Td vaccine (2 - Td) 10/02/2025    Lipid screen  2026    Colon cancer screen colonoscopy  06/14/2029    Flu vaccine  Completed    Hepatitis A vaccine  Aged Out    Hepatitis B vaccine  Aged Out    Hib vaccine  Aged Out    Meningococcal (ACWY) vaccine  Aged Out    Pneumococcal 0-64 years Vaccine  Aged Out    Hepatitis C screen  Discontinued    HIV screen  Discontinued       Review Of Systems:  Skin: no changing moles, abnormal pigmentation, rash, scaling, itching, masses, hair or nail changes  Eyes: no blurring, diplopia, or eye pain  Ears/Nose/Throat: no hearing loss, tinnitus, vertigo, nosebleed, nasal congestion, rhinorrhea, sore throat  Respiratory: no cough, pleuritic chest pain, dyspnea, or wheezing  Cardiovascular: no angina, PINTO, orthopnea, PND, palpitations, or claudication  Gastrointestinal: no nausea, vomiting, heartburn, diarrhea, constipation, bloating, or abdominal pain  Genitourinary: no urinary urgency, frequency, dysuria, nocturia, hesitancy, or incontinence  Musculoskeletal: no arthritis, arthralgia, myalgia, weakness, or morning stiffness  Neurologic: no paralysis, paresis, paresthesia, seizures, tremors, or headaches  Hematologic/Lymphatic/Immunologic: no anemia, abnormal bleeding/bruising, fever, chills, night sweats, swollen glands, or unexplained weight loss  Endocrine: no heat or cold intolerance and no polyphagia, polydipsia, or polyuria    PHYSICAL EXAMINATION:  /68 (Site: Right Upper Arm, Position: Sitting, Cuff Size: Medium Adult)   Pulse 82   Ht 5' 3\" (1.6 m)   Wt 158 lb (71.7 kg)   LMP 08/10/2016   SpO2 98%   BMI 27.99 kg/m²   General appearance: healthy, alert, no distress  Skin: Skin color, texture, turgor normal. No rashes or suspicious lesions. No induration or tightening palpated. Head: Normocephalic. No masses, lesions, tenderness or abnormalities  Eyes: Conjunctivae/corneas clear. PERRL, EOM's intact. Ears: External ears normal. Canals clear. TM's clear bilaterally.  Hearing grossly normal.  Nose/Sinuses: Nares normal.   Oropharynx: Lips, mucosa, and tongue normal. Teeth and gums normal. Oropharynx clear with no exudate seen. Neck: Neck supple, and symmetric. No adenopathy. Thyroid symmetric, normal size, without nodule. Trachea is midline. Back: Back symmetric, no curvature. ROM normal. No CVA tenderness. Lungs: Good diaphragmatic excursion. Lungs clear to auscultation bilaterally. No retractions or use of accessory muscles. Heart: PMI is not displaced, and no thrill noted. Regular rate and rhythm, with no rub, murmur or gallop noted. Breasts: Exam deferred to OB/GYN  Abdomen: Abdomen soft, non-tender. BS normal. No masses, organomegaly. No hernia noted. Extremities: Extremities normal. No deformities, edema, or skin discoloration. No cyanosis or clubbing noted to the nails. Hands and feet were warm and well-perfused with palpable dorsalis pedis pulses bilaterally. Lymph: No lymphadenopathy of the neck or supraclavicular regions. Musculoskeletal: Spine ROM normal. Muscular strength intact. Neuro: Cranial nerves intact, gait normal. No focal weakness. Pelvic: Exam deferred to OB/GYN        ASSESSMENT/PLAN:  1. Annual physical exam    2. Intractable chronic migraine without aura and with status migrainosus  stable  - SUMAtriptan (IMITREX) 100 MG tablet; TAKE 1 TABLET BY MOUTH AT ONSET OF HEADACHE. MAY REPEAT 1 TIME IN 2 HOURS AS NEEDED. DO NOT EXCEED 2 TABLETS IN 24 HOURS  Dispense: 30 tablet; Refill: 5    3. History of 2019 novel coronavirus disease (COVID-19)  Antibodies positive  Discussed timing of covid vaccine, recommend any time now. Pt prefers to wait a few weeks         All health maintenance issues were updated.   Recommend begin progressive daily aerobic exercise program

## 2021-06-01 ENCOUNTER — PATIENT MESSAGE (OUTPATIENT)
Dept: FAMILY MEDICINE CLINIC | Age: 56
End: 2021-06-01

## 2021-06-02 ENCOUNTER — OFFICE VISIT (OUTPATIENT)
Dept: FAMILY MEDICINE CLINIC | Age: 56
End: 2021-06-02
Payer: COMMERCIAL

## 2021-06-02 VITALS
HEART RATE: 75 BPM | OXYGEN SATURATION: 98 % | BODY MASS INDEX: 28.35 KG/M2 | DIASTOLIC BLOOD PRESSURE: 72 MMHG | WEIGHT: 160 LBS | HEIGHT: 63 IN | SYSTOLIC BLOOD PRESSURE: 122 MMHG

## 2021-06-02 DIAGNOSIS — G47.33 MILD OBSTRUCTIVE SLEEP APNEA: ICD-10-CM

## 2021-06-02 DIAGNOSIS — G43.711 INTRACTABLE CHRONIC MIGRAINE WITHOUT AURA AND WITH STATUS MIGRAINOSUS: ICD-10-CM
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
## 2021-06-02 DIAGNOSIS — S30.861A INSECT BITE OF ABDOMINAL WALL, INITIAL ENCOUNTER: Primary | ICD-10-CM

## 2021-06-02 PROCEDURE — 99213 OFFICE O/P EST LOW 20 MIN: CPT | Performed by: FAMILY MEDICINE

## 2021-06-02 RX ORDER — DOXYCYCLINE HYCLATE 100 MG/1
100 CAPSULE ORAL 2 TIMES DAILY
Qty: 20 CAPSULE | Refills: 0 | Status: SHIPPED | OUTPATIENT
Start: 2021-06-02 | End: 2021-06-12

## 2021-06-02 SDOH — ECONOMIC STABILITY: FOOD INSECURITY: WITHIN THE PAST 12 MONTHS, YOU WORRIED THAT YOUR FOOD WOULD RUN OUT BEFORE YOU GOT MONEY TO BUY MORE.: NEVER TRUE

## 2021-06-02 SDOH — ECONOMIC STABILITY: FOOD INSECURITY: WITHIN THE PAST 12 MONTHS, THE FOOD YOU BOUGHT JUST DIDN'T LAST AND YOU DIDN'T HAVE MONEY TO GET MORE.: NEVER TRUE

## 2021-06-02 ASSESSMENT — SOCIAL DETERMINANTS OF HEALTH (SDOH): HOW HARD IS IT FOR YOU TO PAY FOR THE VERY BASICS LIKE FOOD, HOUSING, MEDICAL CARE, AND HEATING?: NOT HARD AT ALL

## 2021-06-02 NOTE — PROGRESS NOTES
Jared Mccarty is a 60 y.o. female. HPI:  C/o bug bite on abdomen x 1 week. Started with pinpoint back spot that she scratched. Then developed round patch. Has been using topical antibiotic ointment and steroid cream which is helping. No fever/chills. Had similar bite on R shoulder recently that has since improved. F/u migraines- have been stable. Gets usually about one per week. Seems to start w neck pain. Has been trying different positions when working on computer but not helping. Using imitrex PRN which does help. Using CPAP has been helping. ROS:  Gen:  Denies fever, chills, headaches. HEENT:  Denies cold symptoms, sore throat. CV:  Denies chest pain or tightness, palpitations. Pulm:  Denies shortness of breath, cough. Abd:  Denies abdominal pain, change in bowel habits. I have reviewed the patient's medical/surgical/family/social in detail and updated the computerized patient record as appropriate. Current Outpatient Medications   Medication Sig Dispense Refill    SUMAtriptan Succinate 6 MG/0.5ML SOAJ INJECT 0.5ML UNDER THE SKIN AS NEEDED FOR MIGRAINE. MAY REPEAT ONCE AFTER 1 HOUR. DO NOT EXCEED 2 DOSES IN 24 HOURS 3 mL 3    acetaminophen (TYLENOL) 500 MG tablet Take 500 mg by mouth every 6 hours as needed for Pain      loperamide (IMODIUM) 2 MG capsule Take 2 mg by mouth 4 times daily as needed for Diarrhea      FIBER ADULT GUMMIES PO Take by mouth daily      fesoterodine (TOVIAZ) 4 MG TB24 ER tablet Take 4 mg by mouth daily      Cyanocobalamin (B-12) 1000 MCG CAPS Take by mouth daily       Cholecalciferol (VITAMIN D3) 5000 units TABS Take by mouth daily       fluticasone (FLONASE) 50 MCG/ACT nasal spray 2 sprays each nostril daily 1 Bottle 5    Aspirin-Acetaminophen-Caffeine (EXCEDRIN PO) Take  by mouth.  SUMAtriptan (IMITREX) 100 MG tablet TAKE 1 TABLET BY MOUTH AT ONSET OF HEADACHE. MAY REPEAT 1 TIME IN 2 HOURS AS NEEDED.  DO NOT EXCEED 2 TABLETS IN 24 HOURS 30 tablet 5     No current facility-administered medications for this visit. Past Medical History:   Diagnosis Date    Allergic rhinitis due to dust 2017    Chronic migraine 1/10/2017    Frequent UTI     Mild obstructive sleep apnea     Overactive bladder     Vitamin D deficiency      Past Surgical History:   Procedure Laterality Date    CHOLECYSTECTOMY      COLONOSCOPY N/A 2019    COLONOSCOPY performed by Derrick Hoffman MD at [address]       Family History   Problem Relation Age of Onset    Heart Disease Mother     High Blood Pressure Mother     Hypertension Mother     Diabetes Father     Heart Disease Father     Hypertension Father     Migraines Other     Stroke Other     Migraines Other     Dementia Other     Diabetes Other     Hypertension Other     Dementia Other     Alcohol Abuse Other     Cancer Other      Social History     Socioeconomic History    Marital status:      Spouse name: Not on file    Number of children: Not on file    Years of education: Not on file    Highest education level: Not on file   Occupational History    Not on file   Tobacco Use    Smoking status: Never Smoker    Smokeless tobacco: Never Used   Vaping Use    Vaping Use: Never used   Substance and Sexual Activity    Alcohol use: No    Drug use: No    Sexual activity: Not on file   Other Topics Concern    Not on file   Social History Narrative    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        Daughter Rosalee     Social Determinants of Health     Financial Resource Strain: Low Risk     Difficulty of Paying Living Expenses: Not hard at all   Food Insecurity: No Food Insecurity    Worried About [address] in the Last Year: Never true    [facility] in the Last Year: Never true   Transportation Needs:     Lack of Transportation (Medical):      Lack of Transportation (Non-Medical):    Physical Activity:     Days of Exercise per Week:     Minutes of Exercise per Session:    Stress:     Feeling of Stress :    Social Connections:     Frequency of Communication with Friends and Family:     Frequency of Social Gatherings with Friends and Family:     Attends Holiness Services:     Active Member of Clubs or Organizations:     Attends Club or Organization Meetings:     Marital Status:    Intimate Partner Violence:     Fear of Current or Ex-Partner:     Emotionally Abused:     Physically Abused:     Sexually Abused:          OBJECTIVE:  /72 (Site: Right Upper Arm, Position: Sitting, Cuff Size: Medium Adult)   Pulse 75   Ht 5' 3\" (1.6 m)   Wt 160 lb (72.6 kg)   LMP 08/10/2016   SpO2 98%   BMI 28.34 kg/m²   GEN:  WDWN, NAD  HEENT:  NCAT, PERRL, EOMI. DERM: 7cm blanching erythematous area on R lower abdomen w scant petechiae. No puncture wound visible  PSYCH: normal mood and affect. Intact judgement and insight  NEURO: A&O x 3            ASSESSMENT/PLAN:  1. Insect bite of abdominal wall, initial encounter  Will do PO abx given little improvement w topical therapy  F/u if persists  - doxycycline hyclate (VIBRAMYCIN) 100 MG capsule; Take 1 capsule by mouth 2 times daily for 10 days  Dispense: 20 capsule; Refill: 0    2. Intractable chronic migraine without aura and with status migrainosus  stable    3.  Mild obstructive sleep apnea  stable

## 2021-06-25 ENCOUNTER — TELEPHONE (OUTPATIENT)
Dept: DERMATOLOGY | Age: 56
End: 2021-06-25

## 2021-06-25 NOTE — TELEPHONE ENCOUNTER
Pt calling to schedule appt was a formal patient of  have a scalp issue she was offered to contact [address] [place] [address] office but was not sure if her insurance would be accepted pls return pt call back @ [phone] to discuss

## 2021-07-07 ENCOUNTER — TELEPHONE (OUTPATIENT)
Dept: FAMILY MEDICINE CLINIC | Age: 56
End: 2021-07-07

## 2021-07-07 NOTE — TELEPHONE ENCOUNTER
(blank)[place] forms [facility]    Scanned into media and given to Dr. Vicki Cardona

## 2021-07-15 ENCOUNTER — TELEPHONE (OUTPATIENT)
Dept: ADMINISTRATIVE | Age: 56
End: 2021-07-15

## 2021-07-16 DIAGNOSIS — G43.711 INTRACTABLE CHRONIC MIGRAINE WITHOUT AURA AND WITH STATUS MIGRAINOSUS: ICD-10-CM

## 2021-07-16 RX ORDER — SUMATRIPTAN 100 MG/1
TABLET, FILM COATED ORAL
Qty: 30 TABLET | Refills: 5 | Status: SHIPPED | OUTPATIENT
Start: 2021-07-16 | End: 2022-02-10

## 2021-07-16 NOTE — TELEPHONE ENCOUNTER
Medication:   Requested Prescriptions      No prescriptions requested or ordered in this encounter        Last Filled:  3/24/2021 #30 R5   patient needs prior auth for the tablets. Previous prior auth was sent for the injection. Patient Phone Number: [phone] (home) [phone] (work)    Last appt: 6/2/2021   Next appt: Visit date not found    Last OARRS: No flowsheet data found.

## 2021-07-28 ENCOUNTER — PATIENT MESSAGE (OUTPATIENT)
Dept: FAMILY MEDICINE CLINIC | Age: 56
End: 2021-07-28

## 2021-07-28 NOTE — TELEPHONE ENCOUNTER
From: Jaki Almaraz  To: Fabiana Walker MD  Sent: 7/28/2021 3:31 PM EDT  Subject: Non-Urgent Medical Question    Dr. Agata Pinedo- I was in for my [facility] physical in March. [address] now wants us to upload it or fax to them at [facility]. I believe there is a [facility] health provider screening form that has to be filled out. Not sure if you have access to this or I have to print out and drop off for you to fill out? Plus I believe it needs my waist measurement that was not done during visit. Should I make an appointment for this ? Let me know what is best to get this done. Thank you.      Neha Garcia

## 2021-08-16 DIAGNOSIS — G43.711 INTRACTABLE CHRONIC MIGRAINE WITHOUT AURA AND WITH STATUS MIGRAINOSUS: ICD-10-CM

## 2021-08-16 RX ORDER — CEFUROXIME AXETIL 250 MG/1
TABLET ORAL
Qty: 3 ML | Refills: 3 | Status: SHIPPED | OUTPATIENT
Start: 2021-08-16

## 2021-08-16 NOTE — TELEPHONE ENCOUNTER
Medication:   Requested Prescriptions     Pending Prescriptions Disp Refills    SUMAtriptan Succinate 6 MG/0.5ML SOAJ [Pharmacy Med Name: SUMATRIPTAN 6MG/0.5 INJ 2X0.5ML] 3 mL 3     Sig: INJECT 0.5 ML UNDER THE SKIN AS NEEDED FOR MIGRAINE. MAY REPEAT ONCE AFTER 1 HOUR. DO NOT EXCEED 2 DOSES IN 24 HOURS        Last Filled:  7/16/21 30 tabs 5 rf    Patient Phone Number: [phone] (home) [phone] (work)    Last appt: 6/2/2021   Next appt: Visit date not found    Last OARRS: No flowsheet data found.

## 2021-08-17 NOTE — TELEPHONE ENCOUNTER
Patient dropped off her [facility] form to the office, please complete and fax to [phone] blank form is scanned and attached to this encounter.

## 2021-11-29 ENCOUNTER — TELEPHONE (OUTPATIENT)
Dept: PULMONOLOGY | Age: 56
End: 2021-11-29

## 2021-11-29 NOTE — PROGRESS NOTES
Nathalie Casillas         : 1965        PHONE: [phone] (home) [phone] (work)  [x] [address]     [] [address]      [] [employer]     [employer]    [] Marie Simon  [] [employer]     [] Other:    Diagnosis: [x] THO (G47.33) [] CSA (G47.31) [] Apnea (G47.30)   Length of Need: [] 12 Months [x] 99 Months [] Other:    Machine (EMORY!): [] Respironics Dream Station      Auto [] ResMed AirSense     Auto [] Other:     []  CPAP () [] Bilevel ()   Mode: [] Auto [] Spontaneous    Mode: [] Auto [] Spontaneous                            Comfort Settings:   - Ramp Pressure: 5 cmH2O                                        - Ramp time: 15 min                                     -  Flex/EPR - 3 full time                                    - For ResMed Bilevel (TiMax-4 sec   TiMin- 0.2 sec)     Humidifier: [] Heated ()        [x] Water chamber replacement ()/ 1 per 6 months        Mask:   [x] Nasal () /1 per 3 months [] Full Face () /1 per 3 months   [x] Patient choice -Size and fit mask [] Patient Choice - Size and fit mask   [] Dispense:  [] Dispense:    [x] Headgear () / 1 per 3 months [] Headgear () / 1 per 3 months   [x] Replacement Nasal Cushion ()/2 per month [] Interface Replacement ()/1 per month   [x] Replacement Nasal Pillows ()/2 per month         Tubing: [x] Heated ()/1 per 3 months    [] Standard ()/1 per 3 months [] Other:           Filters: [x] Non-disposable ()/1 per 6 months     [x] Ultra-Fine, Disposable ()/2 per month        Miscellaneous: [] Chin Strap ()/ 1 per 6 months [] O2 bleed-in:       LPM   [] Oximetry on CPAP/Bilevel []  Other:          Start Order Date: 21    MEDICAL JUSTIFICATION:  I, the undersigned, certify that the above prescribed supplies are medically necessary for this patients wellbeing.   In my opinion, the supplies are both reasonable and necessary in reference to accepted standards of medicalpractice in treatment of this patients condition.     Peri De Anda MD      NPI: [id number]       Order Signed Date: 11/29/21    Electronically signed by Peri De Anda MD on 11/29/2021 at 2:42 PM

## 2021-11-29 NOTE — TELEPHONE ENCOUNTER
Patient has CPAP follow up scheduled for January due to Dr. Nic Gutierrez vacation.  Please continue to fill supplies

## 2021-12-07 ENCOUNTER — PATIENT MESSAGE (OUTPATIENT)
Dept: FAMILY MEDICINE CLINIC | Age: 56
End: 2021-12-07

## 2021-12-08 ENCOUNTER — TELEPHONE (OUTPATIENT)
Dept: FAMILY MEDICINE CLINIC | Age: 56
End: 2021-12-08

## 2021-12-08 ENCOUNTER — VIRTUAL VISIT (OUTPATIENT)
Dept: FAMILY MEDICINE CLINIC | Age: 56
End: 2021-12-08
Payer: COMMERCIAL

## 2021-12-08 DIAGNOSIS — G43.711 INTRACTABLE CHRONIC MIGRAINE WITHOUT AURA AND WITH STATUS MIGRAINOSUS: Primary | ICD-10-CM

## 2021-12-08 PROCEDURE — 99213 OFFICE O/P EST LOW 20 MIN: CPT | Performed by: FAMILY MEDICINE

## 2021-12-08 RX ORDER — UBROGEPANT 100 MG/1
100 TABLET ORAL PRN
Qty: 30 TABLET | Refills: 5 | Status: SHIPPED | OUTPATIENT
Start: 2021-12-08 | End: 2022-03-17 | Stop reason: SDUPTHER

## 2021-12-08 RX ORDER — TIZANIDINE 2 MG/1
2 TABLET ORAL NIGHTLY PRN
Qty: 30 TABLET | Refills: 1 | Status: SHIPPED | OUTPATIENT
Start: 2021-12-08 | End: 2022-04-05 | Stop reason: ALTCHOICE

## 2021-12-08 NOTE — PROGRESS NOTES
2021    TELEHEALTH EVALUATION -- Audio/Visual (During BTQHA-15 public health emergency)    HPI:    Annabel Dupont (:  1965) has requested an audio/video evaluation for the following concern(s):    Has been working from home the past year and a half. On computer a lot. Had covid infection about a year ago, had bad headaches with it. Recently had covid vaccine, did notice headaches after second shot similar to the ones she had with covid infection. Long hx migraines. Has tried topamax and BP meds as prophylaxis in the past which didn't help. Has been taking imitrex for years, usually helps but recently has been causing left leg pain. Wondering about Kiley Dahl, knows someone who has taken it and has helped them. Having neck pain, seeing chiropractor which helps. No longer waking up at night with neck pain. Has mild headaches in the mornings, sometimes gets worse and turns into migraines. Review of Systems:  Gen:  Denies fever, chills. No weight loss  HEENT:  Denies cold symptoms, sore throat. CV:  Denies chest pain or tightness, palpitations. Pulm:  Denies shortness of breath, cough. Abd:  Denies abdominal pain, change in bowel habits. Prior to Visit Medications    Medication Sig Taking? Authorizing Provider   SUMAtriptan Succinate 6 MG/0.5ML SOAJ INJECT 0.5 ML UNDER THE SKIN AS NEEDED FOR MIGRAINE. MAY REPEAT ONCE AFTER 1 HOUR.  DO NOT EXCEED 2 DOSES IN 24 HOURS Yes Domi Ornelas MD   acetaminophen (TYLENOL) 500 MG tablet Take 500 mg by mouth every 6 hours as needed for Pain Yes Historical Provider, MD   loperamide (IMODIUM) 2 MG capsule Take 2 mg by mouth 4 times daily as needed for Diarrhea Yes Historical Provider, MD   [facility] [place] Take by mouth daily Yes Historical Provider, MD   fesoterodine (TOVIAZ) 4 MG TB24 ER tablet Take 4 mg by mouth daily Yes Historical Provider, MD   Cyanocobalamin (B-12) 1000 MCG CAPS Take by mouth daily  Yes Historical Provider, MD Cholecalciferol (VITAMIN D3) 5000 units TABS Take by mouth daily  Yes Historical Provider, MD   fluticasone (FLONASE) 50 MCG/ACT nasal spray 2 sprays each nostril daily Yes Rajan Prtichett MD   Aspirin-Acetaminophen-Caffeine (EXCEDRIN PO) Take  by mouth. Yes Historical Provider, MD   SUMAtriptan (IMITREX) 100 MG tablet TAKE 1 TABLET BY MOUTH AT ONSET OF HEADACHE. MAY REPEAT 1 TIME IN 2 HOURS AS NEEDED. DO NOT EXCEED 2 TABLETS IN 24 HOURS  Lucy Jackson MD       Past Medical History:   Diagnosis Date    Allergic rhinitis due to dust 1/11/2017    Chronic migraine 1/10/2017    Frequent UTI     Mild obstructive sleep apnea     Overactive bladder     Vitamin D deficiency        Past Surgical History:   Procedure Laterality Date    CHOLECYSTECTOMY      COLONOSCOPY N/A 6/14/2019    [address] Kobe Smith MD [address]         Family History   Problem Relation Age of Onset    Heart Disease Mother     High Blood Pressure Mother     Hypertension Mother     Diabetes Father     Heart Disease Father     Hypertension Father     Migraines Other     Stroke Other     Migraines Other     Dementia Other     Diabetes Other     Hypertension Other     Dementia Other     Alcohol Abuse Other     Cancer Other        Allergies   Allergen Reactions    Sulfa Antibiotics Hives     Pt has taken this previously with no complications, states she had hives the last time she took Bactrim.     Ciprofloxacin Other (See Comments)     Muscle aches        Social History     Tobacco Use    Smoking status: Never Smoker    Smokeless tobacco: Never Used   Vaping Use    Vaping Use: Never used   Substance Use Topics    Alcohol use: No    Drug use: No          PHYSICAL EXAMINATION:  Vital Signs: (As obtained by patient/caregiver or practitioner observation)  LMP 08/10/2016   Patient-Reported Vitals 1/11/2021   Patient-Reported Weight 151lb        Respiratory rate appears normal      Constitutional: Appears well-developed and well-nourished. No apparent distress    Mental status: Alert and awake. Oriented to person/place/time. Able to follow commands    Eyes: EOM normal. Sclera normal. No discharge visible  HENT: Normocephalic, atraumatic. Mouth/Throat: Mucous membranes are moist. External Ears Normal    Neck: No visualized mass   Pulmonary/Chest: Respiratory effort normal.  No visualized signs of difficulty breathing or respiratory distress        Musculoskeletal:  Normal range of motion of neck  Neurological:       No Facial Asymmetry (Cranial nerve 7 motor function) (limited exam to video visit). No gaze palsy       Skin:  No significant exanthematous lesions or discoloration noted on facial skin       Psychiatric: Normal Affect. No Hallucinations            ASSESSMENT/PLAN:  1. Intractable chronic migraine without aura and with status migrainosus  Trial tizanidine qhs for neck pain  ubrelvy PRN migraines. D/c imitrex  To neuro headache specialist if no improvement  - tiZANidine (ZANAFLEX) 2 MG tablet; Take 1 tablet by mouth nightly as needed (neck pain)  Dispense: 30 tablet; Refill: 1  - Ubrogepant (UBRELVY) 100 MG TABS; Take 100 mg by mouth as needed (migraines) . Take at onset of migraine. May repeat dose in 2 hours if not effective. Do not exceed 2 pills in 24 hour period  Dispense: 30 tablet; Refill: 5      No follow-ups on file. Reyes Brisker is a 60 y.o. female being evaluated by a Virtual Visit (video visit) encounter to address concerns as mentioned above. A caregiver was present when appropriate. Due to this being a TeleHealth encounter (During QWGJM-67 public health emergency), evaluation of the following organ systems was limited: Vitals/Constitutional/EENT/Resp/CV/GI//MS/Neuro/Skin/Heme-Lymph-Imm.   Pursuant to the emergency declaration under the [facility] [address] and Response Supplemental Appropriations Act, this Virtual Visit was conducted with patient's (and/or legal guardian's) consent, to reduce the patient's risk of exposure to COVID-19 and provide necessary medical care. The patient (and/or legal guardian) has also been advised to contact this office for worsening conditions or problems, and seek emergency medical treatment and/or call 911 if deemed necessary. Patient identification was verified at the start of the visit: Yes    Total time spent on this encounter: This encounter was not billed based on time. Services were provided through a video synchronous discussion virtually to substitute for in-person clinic visit. Patient was located in their home. Provider was located in the office. --Dana Phillip MD on 12/8/2021 at 9:37 AM    An electronic signature was used to authenticate this note. Fritz Gonzalez

## 2021-12-16 ENCOUNTER — TELEPHONE (OUTPATIENT)
Dept: RHEUMATOLOGY | Age: 56
End: 2021-12-16

## 2021-12-16 NOTE — TELEPHONE ENCOUNTER
PA COVER MY MEDS  Medication:Ubrelvy 100MG tablets  Key:[id number]  Rx #: [id number]  Status:pending    Message from Plan  The request has been approved. The authorization is effective for a maximum of 6 fills from 12/16/2021 to 06/15/2022, as long as the member is enrolled in their current health plan. The request was approved as submitted. This request has been approved with a quantity limit of 16 tablets per 30 days.

## 2022-01-04 ENCOUNTER — NURSE ONLY (OUTPATIENT)
Dept: FAMILY MEDICINE CLINIC | Age: 57
End: 2022-01-04
Payer: COMMERCIAL

## 2022-01-04 PROCEDURE — 90674 CCIIV4 VAC NO PRSV 0.5 ML IM: CPT | Performed by: FAMILY MEDICINE

## 2022-01-04 PROCEDURE — 90471 IMMUNIZATION ADMIN: CPT | Performed by: FAMILY MEDICINE

## 2022-01-12 ENCOUNTER — PATIENT MESSAGE (OUTPATIENT)
Dept: FAMILY MEDICINE CLINIC | Age: 57
End: 2022-01-12

## 2022-01-12 ENCOUNTER — TELEPHONE (OUTPATIENT)
Dept: FAMILY MEDICINE CLINIC | Age: 57
End: 2022-01-12

## 2022-01-18 ENCOUNTER — PATIENT MESSAGE (OUTPATIENT)
Dept: FAMILY MEDICINE CLINIC | Age: 57
End: 2022-01-18

## 2022-02-09 ENCOUNTER — TELEPHONE (OUTPATIENT)
Dept: PULMONOLOGY | Age: 57
End: 2022-02-09

## 2022-02-10 ENCOUNTER — OFFICE VISIT (OUTPATIENT)
Dept: SLEEP MEDICINE | Age: 57
End: 2022-02-10
Payer: COMMERCIAL

## 2022-02-10 VITALS
HEART RATE: 81 BPM | DIASTOLIC BLOOD PRESSURE: 80 MMHG | BODY MASS INDEX: 27.82 KG/M2 | TEMPERATURE: 96.9 F | SYSTOLIC BLOOD PRESSURE: 130 MMHG | WEIGHT: 157 LBS | HEIGHT: 63 IN | OXYGEN SATURATION: 98 % | RESPIRATION RATE: 18 BRPM

## 2022-02-10 DIAGNOSIS — Z99.89 OSA ON CPAP: Primary | ICD-10-CM

## 2022-02-10 DIAGNOSIS — G47.33 OSA ON CPAP: Primary | ICD-10-CM

## 2022-02-10 DIAGNOSIS — Z99.89 DEPENDENCE ON OTHER ENABLING MACHINES AND DEVICES: ICD-10-CM

## 2022-02-10 PROCEDURE — 99214 OFFICE O/P EST MOD 30 MIN: CPT | Performed by: PSYCHIATRY & NEUROLOGY

## 2022-02-10 ASSESSMENT — SLEEP AND FATIGUE QUESTIONNAIRES
HOW LIKELY ARE YOU TO NOD OFF OR FALL ASLEEP WHILE LYING DOWN TO REST IN THE AFTERNOON WHEN CIRCUMSTANCES PERMIT: 0
HOW LIKELY ARE YOU TO NOD OFF OR FALL ASLEEP IN A CAR, WHILE STOPPED FOR A FEW MINUTES IN TRAFFIC: 0
HOW LIKELY ARE YOU TO NOD OFF OR FALL ASLEEP WHEN YOU ARE A PASSENGER IN A CAR FOR AN HOUR WITHOUT A BREAK: 0
HOW LIKELY ARE YOU TO NOD OFF OR FALL ASLEEP WHILE SITTING INACTIVE IN A PUBLIC PLACE: 0
HOW LIKELY ARE YOU TO NOD OFF OR FALL ASLEEP WHILE SITTING AND TALKING TO SOMEONE: 0
ESS TOTAL SCORE: 0
HOW LIKELY ARE YOU TO NOD OFF OR FALL ASLEEP WHILE WATCHING TV: 0
HOW LIKELY ARE YOU TO NOD OFF OR FALL ASLEEP WHILE SITTING AND READING: 0
HOW LIKELY ARE YOU TO NOD OFF OR FALL ASLEEP WHILE SITTING QUIETLY AFTER LUNCH WITHOUT ALCOHOL: 0

## 2022-02-10 ASSESSMENT — ENCOUNTER SYMPTOMS: APNEA: 0

## 2022-02-10 NOTE — PROGRESS NOTES
Eusebia Anderson         : 1965        PHONE: [phone] (home) [phone] (work)  [x] [address]     [] [address]      [] [address]     [][employer]    [] [employer]   [] [employer]     [] Other:    Diagnosis: [x] THO (G47.33) [] CSA (G47.31) [] Apnea (G47.30)   Length of Need: [] 12 Months [x] 99 Months [] Other:    Machine (EMORY!): [] Respironics Dream Station      Auto [] ResMed AirSense     Auto [] Other:     []  CPAP () [] Bilevel ()   Mode: [] Auto [] Spontaneous    Mode: [] Auto [] Spontaneous                            Comfort Settings:   - Ramp Pressure: 5 cmH2O                                        - Ramp time: 15 min                                     -  Flex/EPR - 3 full time                                    - For ResMed Bilevel (TiMax-4 sec   TiMin- 0.2 sec)     Humidifier: [] Heated ()        [x] Water chamber replacement ()/ 1 per 6 months        Mask:   [x] Nasal () /1 per 3 months [] Full Face () /1 per 3 months   [x] Patient choice -Size and fit mask [] Patient Choice - Size and fit mask   [] Dispense:  [] Dispense:    [x] Headgear () / 1 per 3 months [] Headgear () / 1 per 3 months   [x] Replacement Nasal Cushion ()/2 per month [] Interface Replacement ()/1 per month   [x] Replacement Nasal Pillows ()/2 per month         Tubing: [x] Heated ()/1 per 3 months    [] Standard ()/1 per 3 months [] Other:           Filters: [x] Non-disposable ()/1 per 6 months     [x] Ultra-Fine, Disposable ()/2 per month        Miscellaneous: [x] Chin Strap ()/ 1 per 6 months [] O2 bleed-in:       LPM   [] Oximetry on CPAP/Bilevel []  Other:          Start Order Date: 02/10/22    MEDICAL JUSTIFICATION:  I, the undersigned, certify that the above prescribed supplies are medically necessary for this patients wellbeing.   In my opinion, the supplies are both reasonable and necessary in reference to accepted standards of medicalpractice in treatment of this patients condition.     Lu Mendosa MD      NPI: [id number]       Order Signed Date: 02/10/22    Electronically signed by Lu Mendosa MD on 2/10/2022 at 3:45 PM

## 2022-02-10 NOTE — PROGRESS NOTES
MD MANDIE Allen Board Certified in Sleep Medicine  Certified in [address] [place] [address] Certified in Neurology Theodore Heena Mccann [address], [address], [address]ente [address]  [phone]   [address] [place] [address], [address] [place] [address] Ne                      [address]  [address] [address] [phone]    Subjective:     Patient ID: Jacqueline Zhao is a 60 y.o. female. Chief Complaint   Patient presents with    Follow-up    Sleep Apnea       HPI:        Jacqueline Zhao is a 60 y.o. female was seen today as annual follow for mild obstructive sleep apnea with apnea hypopnea index of 5.0/hr with lowest O2 saturation of 91%, patient spent about 0 minutes below 90%. Patient is using the PAP machine about 80% of the time, more than 4 hours a nightabout  68 %, in total average of 6:20 hours a night in last 90 days. Currently on PAP at 10 cm (5-15), the AHI is only 1.3 events per hour at this pressure. Patient improved regarding daytime sleepiness and fatigue, wakes up refreshed in the morning. The Patient scored Total score: 0 on [place] Sleepiness Scale ( more than 10 is indicative of daytime sleepiness)   Patient has no problem with PAP pressure or mask, nasal pillow mask. The headache has improved with CPAP usage. DOT/CDL - N/A        Previous Report(s)Reviewed: historical medical records         Social History     Socioeconomic History    Marital status:      Spouse name: Not on file    Number of children: Not on file    Years of education: Not on file    Highest education level: Not on file   Occupational History    Not on file   Tobacco Use    Smoking status: Never Smoker    Smokeless tobacco: Never Used   Vaping Use    Vaping Use: Never used   Substance and Sexual Activity    Alcohol use:  No  Drug use: No    Sexual activity: Not on file   Other Topics Concern    Not on file   Social History Narrative    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        Daughter [facility]     Social Determinants of Health     Financial Resource Strain: Low Risk     Difficulty of Paying Living Expenses: Not hard at all   Food Insecurity: No Food Insecurity    Worried About [address] in the Last Year: Never true    [address] in the Last Year: Never true   Transportation Needs:     Lack of Transportation (Medical): Not on file    Lack of Transportation (Non-Medical): Not on file   Physical Activity:     Days of Exercise per Week: Not on file    Minutes of Exercise per Session: Not on file   Stress:     Feeling of Stress : Not on file   Social Connections:     Frequency of Communication with Friends and Family: Not on file    Frequency of Social Gatherings with Friends and Family: Not on file    Attends Jain Services: Not on file    Active Member of [address] [place] [address] or Organizations: Not on file    Attends Club or Organization Meetings: Not on file    Marital Status: Not on file   Intimate Partner Violence:     Fear of Current or Ex-Partner: Not on file    Emotionally Abused: Not on file    Physically Abused: Not on file    Sexually Abused: Not on file   Housing Stability:     Unable to Pay for Housing in the Last Year: Not on file    Number of [place] in the Last Year: Not on file    Unstable Housing in the Last Year: Not on file       Prior to Admission medications    Medication Sig Start Date End Date Taking? Authorizing Provider   tiZANidine (ZANAFLEX) 2 MG tablet Take 1 tablet by mouth nightly as needed (neck pain) 21  Yes Tari Blackwell MD   Ubrogepant (UBRELVY) 100 MG TABS Take 100 mg by mouth as needed (migraines) . Take at onset of migraine. May repeat dose in 2 hours if not effective.  Do not exceed 2 pills in 24 hour period 21  Yes Tari Blackwell MD  High Blood Pressure Mother     Hypertension Mother     Diabetes Father     Heart Disease Father     Hypertension Father     Migraines Other     Stroke Other     Migraines Other     Dementia Other     Diabetes Other     Hypertension Other     Dementia Other     Alcohol Abuse Other     Cancer Other        Review of Systems   Constitutional: Negative for fatigue. Respiratory: Negative for apnea. Cardiovascular: Negative for leg swelling. Genitourinary: Negative for frequency. Neurological: Positive for headaches (MHA). Objective:     Vitals:  Weight BMI Neck circumference    Wt Readings from Last 3 Encounters:   02/10/22 157 lb (71.2 kg)   06/02/21 160 lb (72.6 kg)   03/24/21 158 lb (71.7 kg)    Body mass index is 27.81 kg/m². BP HR SaO2   BP Readings from Last 3 Encounters:   02/10/22 (!) 140/90   06/02/21 122/72   03/24/21 112/68    Pulse Readings from Last 3 Encounters:   02/10/22 81   06/02/21 75   03/24/21 82    SpO2 Readings from Last 3 Encounters:   02/10/22 98%   06/02/21 98%   03/24/21 98%        Themandibular molar Class :   [x]1 []2 []3      Mallampati I Airway Classification:   []1 []2 [x]3 []4      Physical Exam  Vitals and nursing note reviewed. Constitutional:       Appearance: Normal appearance. HENT:      Head: Atraumatic. Nose: Nose normal.      Mouth/Throat:      Mouth: Mucous membranes are moist.   Cardiovascular:      Rate and Rhythm: Regular rhythm. Heart sounds: Normal heart sounds. Pulmonary:      Effort: Pulmonary effort is normal.      Breath sounds: Normal breath sounds. Musculoskeletal:         General: Normal range of motion. Cervical back: Normal range of motion. Skin:     General: Skin is warm. Neurological:      Mental Status: Mental status is at baseline. Psychiatric:         Mood and Affect: Mood normal.         :   Mild Obstructive Sleep Apnea/Hypopnea Syndrome under good control on PAP at 10 cmwp.      Diagnosis Orders   1. THO on CPAP     2. Dependence on other enabling machines and devices       Plan: Will continue the PAP at 5-15 cmwp. I will order PAP supplies, mask, filters. ... The headache has improved    No orders of the defined types were placed in this encounter. Return in about 1 year (around 2/10/2023) for Reveiwing CPAP usage and compliance report and tro.     Vivi Heredia MD  Medical Director [address] [place] [address]

## 2022-02-10 NOTE — PATIENT INSTRUCTIONS
Patient Education        Learning About CPAP for Sleep Apnea  What is CPAP? CPAP is a small machine that you use at home every night while you sleep. It increases air pressure in your throat to keep your airway open. When you have sleep apnea, this can help you sleep better, feel better, and avoid future health problems. CPAP stands for \"continuous positive airway pressure. \"  The CPAP machine will have one of the following:  · A mask that covers your nose and mouth  · A mask that covers your nose only  · A nasal pillow that covers only the openings of your nose  Why is it done? CPAP is usually the best treatment for obstructive sleep apnea. It is the first treatment choice and the most widely used. CPAP:  · Helps you have more normal sleep, so you feel less sleepy and more alert during the daytime. · May help keep heart failure or other heart problems from getting worse. · May help lower your blood pressure. If you have a bed partner, they may also sleep better when you use a CPAP. That's because you aren't snoring or restless. Your doctor may suggest CPAP if you have:  · Moderate to severe sleep apnea. · Sleep apnea and coronary artery disease (CAD). · Sleep apnea and heart failure. What are the side effects? Some people who use CPAP have:  · A dry or stuffy nose and a sore throat. · Irritated skin on the face. · Bloating. How can you care for yourself? If using CPAP is not comfortable, or if you have certain side effects, work with your doctor to fix them. Here are some things you can try:  · Be sure the mask, nasal mask, or nasal pillow fits well. · See if your doctor can adjust the pressure of your CPAP. · If your nose or mouth is dry, set the machine to deliver warmer or wetter air. Or try using a humidifier. · If your nose is runny or stuffy, talk to your doctor about using a decongestant medicine or steroid nasal spray. Be safe with medicines.  Read and follow all instructions on the label. Do not use the medicine longer than the label says. · Your doctor may also help you with problems like swallowing air, bloating, or claustrophobia. Talk to your doctor if you're still having problems. If these things don't help, you might try a different type of machine. Where can you learn more? Go to https://I'mOKpepiceweb.[employer]. org and sign in to your [employer] account. Enter B038 in the [employer] box to learn more about \"Learning About CPAP for Sleep Apnea. \"     If you do not have an account, please click on the \"Sign Up Now\" link. Current as of: July 6, 2021               Content Version: 13.1  © 2006-2021 Healthwise, Incorporated. Care instructions adapted under license by [facility] ([facility]). If you have questions about a medical condition or this instruction, always ask your healthcare professional. [address] any warranty or liability for your use of this information.

## 2022-02-19 ENCOUNTER — HOSPITAL ENCOUNTER (OUTPATIENT)
Dept: MAMMOGRAPHY | Age: 57
Discharge: HOME | End: 2022-02-19
Payer: COMMERCIAL

## 2022-02-19 VITALS — WEIGHT: 154 LBS | HEIGHT: 63 IN | BODY MASS INDEX: 27.29 KG/M2

## 2022-02-19 DIAGNOSIS — Z12.31 VISIT FOR SCREENING MAMMOGRAM: ICD-10-CM

## 2022-02-19 PROCEDURE — 77063 BREAST TOMOSYNTHESIS BI: CPT

## 2022-03-16 ENCOUNTER — TELEPHONE (OUTPATIENT)
Dept: FAMILY MEDICINE CLINIC | Age: 57
End: 2022-03-16

## 2022-03-16 DIAGNOSIS — G43.711 INTRACTABLE CHRONIC MIGRAINE WITHOUT AURA AND WITH STATUS MIGRAINOSUS: ICD-10-CM

## 2022-03-16 NOTE — TELEPHONE ENCOUNTER
Submitted PA for Ubrelvy 100MG tablets  Via CM Key: [id number] Per plan \"Mediction has been previously approved for a quantity of 16 tablets per 30 days, and has 6 refills remaining through 06/15/2022. Letter is attached. Please advise patient. Thank you.

## 2022-03-16 NOTE — TELEPHONE ENCOUNTER
A  scanned  request for a  Prior Authorization for medication is attached to this encounter. Please initiate  this request with the patients insurance company.  Thank  You       UBRELVY 100 MG

## 2022-03-17 NOTE — TELEPHONE ENCOUNTER
Medication:   Requested Prescriptions     Pending Prescriptions Disp Refills    Ubrogepant (UBRELVY) 100 MG TABS 16 tablet 5     Sig: Take 100 mg by mouth as needed (migraines) . Take at onset of migraine. May repeat dose in 2 hours if not effective. Do not exceed 2 pills in 24 hour period        Last Filled:  Please send new script with insurance requirement    Patient Phone Number: [phone] (home) [phone] (work)    Last appt: 12/8/2021   Next appt: Visit date not found    Last OARRS: No flowsheet data found.

## 2022-03-21 RX ORDER — UBROGEPANT 100 MG/1
100 TABLET ORAL PRN
Qty: 16 TABLET | Refills: 5 | Status: SHIPPED | OUTPATIENT
Start: 2022-03-21 | End: 2022-06-20 | Stop reason: SDUPTHER

## 2022-04-05 ENCOUNTER — OFFICE VISIT (OUTPATIENT)
Dept: SURGERY | Age: 57
End: 2022-04-05
Payer: COMMERCIAL

## 2022-04-05 VITALS
HEIGHT: 63 IN | BODY MASS INDEX: 27.29 KG/M2 | DIASTOLIC BLOOD PRESSURE: 70 MMHG | HEART RATE: 85 BPM | OXYGEN SATURATION: 98 % | SYSTOLIC BLOOD PRESSURE: 114 MMHG | WEIGHT: 154 LBS

## 2022-04-05 DIAGNOSIS — Z91.89 AT HIGH RISK FOR BREAST CANCER: Primary | ICD-10-CM

## 2022-04-05 DIAGNOSIS — Z80.3 FAMILY HISTORY OF BREAST CANCER: ICD-10-CM

## 2022-04-05 DIAGNOSIS — Z12.31 VISIT FOR SCREENING MAMMOGRAM: ICD-10-CM

## 2022-04-05 PROCEDURE — 99213 OFFICE O/P EST LOW 20 MIN: CPT | Performed by: SURGERY

## 2022-04-05 NOTE — PROGRESS NOTES
Subjective:      Patient ID: Luisana Ann is a 60 y.o. female. HPI   Chief Complaint   Patient presents with    1 Year Follow Up     breast check - Former pt of Dr. Maria Teresa Davila     Patient is here for high risk for breast cancer follow up. She reports a history of multiple cyst aspirations by Dr. Marc Villarreal in the past.  As she has gone through menopause these cysts have become much less bothersome to her. Patient has not felt any breast masses, no breast pain or nipple discharge. Using the TC model, her lifetime risk of developing breast cancer is 21%. Mammogram 2/2022 [facility] 2C    Past Medical History:   Diagnosis Date    Allergic rhinitis due to dust 1/11/2017    Chronic migraine 1/10/2017    Frequent UTI     Mild obstructive sleep apnea     Overactive bladder     Vitamin D deficiency        Past Surgical History:   Procedure Laterality Date    CHOLECYSTECTOMY      COLONOSCOPY N/A 6/14/2019    COLONOSCOPY performed by No Bourne MD at [address]         Current Outpatient Medications   Medication Sig Dispense Refill    Ubrogepant (UBRELVY) 100 MG TABS Take 100 mg by mouth as needed (migraines) . Take at onset of migraine. May repeat dose in 2 hours if not effective. Do not exceed 2 pills in 24 hour period 16 tablet 5    loperamide (IMODIUM) 2 MG capsule Take 2 mg by mouth 4 times daily as needed for Diarrhea      FIBER ADULT GUMMIES PO Take by mouth daily      fesoterodine (TOVIAZ) 4 MG TB24 ER tablet Take 4 mg by mouth daily      Cyanocobalamin (B-12) 1000 MCG CAPS Take by mouth daily       Cholecalciferol (VITAMIN D3) 5000 units TABS Take by mouth daily       fluticasone (FLONASE) 50 MCG/ACT nasal spray 2 sprays each nostril daily 1 Bottle 5    Aspirin-Acetaminophen-Caffeine (EXCEDRIN PO) Take  by mouth.  SUMAtriptan Succinate 6 MG/0.5ML SOAJ INJECT 0.5 ML UNDER THE SKIN AS NEEDED FOR MIGRAINE. MAY REPEAT ONCE AFTER 1 HOUR.  DO NOT EXCEED 2 DOSES IN 24 HOURS (Patient not taking: Reported on 2022) 3 mL 3    acetaminophen (TYLENOL) 500 MG tablet Take 500 mg by mouth every 6 hours as needed for Pain       No current facility-administered medications for this visit. Social History     Socioeconomic History    Marital status:      Spouse name: Not on file    Number of children: Not on file    Years of education: Not on file    Highest education level: Not on file   Occupational History    Not on file   Tobacco Use    Smoking status: Never Smoker    Smokeless tobacco: Never Used   Vaping Use    Vaping Use: Never used   Substance and Sexual Activity    Alcohol use: No    Drug use: No    Sexual activity: Not on file   Other Topics Concern    Not on file   Social History Narrative    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        Daughter Rosalee     Social Determinants of Health     Financial Resource Strain: Low Risk     Difficulty of Paying Living Expenses: Not hard at all   Food Insecurity: No Food Insecurity    Worried About [email or web address] in the Last Year: Never true    [address] [employer] in the Last Year: Never true   Transportation Needs:     Lack of Transportation (Medical): Not on file    Lack of Transportation (Non-Medical):  Not on file   Physical Activity:     Days of Exercise per Week: Not on file    Minutes of Exercise per Session: Not on file   Stress:     Feeling of Stress : Not on file   Social Connections:     Frequency of Communication with Friends and Family: Not on file    Frequency of Social Gatherings with Friends and Family: Not on file    Attends Yarsani Services: Not on file    Active Member of Clubs or Organizations: Not on file    Attends Club or Organization Meetings: Not on file    Marital Status: Not on file   Intimate Partner Violence:     Fear of Current or Ex-Partner: Not on file    Emotionally Abused: Not on file    Physically Abused: Not on file    Sexually Abused: Not on file Housing Stability:     Unable to Pay for Housing in the Last Year: Not on file    Number of Places Lived in the Last Year: Not on file    Unstable Housing in the Last Year: Not on file       Objective:   Physical Exam    Bilateral breasts - Normal contour, scattered fibroglandular tissue with known palpable cysts bilaterally, unchanged per patient, no nipple or skin changes. No cervical or axillary adenopathy. Assessment:      Diagnosis Orders   1. At high risk for breast cancer  MRI BREAST BILATERAL W WO CONTRAST    ZEYNEP REYNALDO DIGITAL SCREEN BILATERAL   2. Family history of breast cancer  MRI BREAST BILATERAL W WO CONTRAST    ZEYNEP REYNALDO DIGITAL SCREEN BILATERAL   3. Visit for screening mammogram  ZEYNEP REYNALDO DIGITAL SCREEN BILATERAL          Plan:     Mammogram was reviewed. At the present time, there is no concern for breast cancer. Healthy lifestyle modifications were reviewed with the patient. Recommend MRI to further evaluate her dense breast tissue. Continue monthly self breast exam, f/u with me in 1 year with mammogram.     On this date 04/05/22 I have spent 20 minutes reviewing previous notes, test results, and face to face with the patient discussing the diagnosis and importance of compliance with the treatment plan as well as documenting on the day of the visit.      Electronically signed by Zuleyma Red MD on 4/5/2022 at 1:54 PM

## 2022-04-05 NOTE — PATIENT INSTRUCTIONS
Mammogram results were discussed with patient today in office  Breast exam was unremarkable for any palpable masses  Continue with monthly self breast exams  MRI ordered - Call [facility] - [phone]  Return to office in one year with bilateral screening mammogram    Healthy Lifestyle Recommendations: healthy diet (decrease consumption of red meat, increase fresh fruits and vegetables), decreased alcohol consumption (less than 4 drinks/week), adequate sleep (goal 6-8 hours), routine exercise (goal 150 minutes/week or greater), weight control.

## 2022-04-20 ENCOUNTER — PATIENT MESSAGE (OUTPATIENT)
Dept: FAMILY MEDICINE CLINIC | Age: 57
End: 2022-04-20

## 2022-04-20 DIAGNOSIS — Z00.00 ANNUAL PHYSICAL EXAM: Primary | ICD-10-CM

## 2022-06-07 NOTE — TELEPHONE ENCOUNTER
Medication:   Requested Prescriptions     Pending Prescriptions Disp Refills    SUMAtriptan (IMITREX) 100 MG tablet 27 tablet 0     Sig: TAKE 1 TABLET BY MOUTH AT ONSET OF HEADACHE. MAY REPEAT 1 TIME IN 2 HOURS AS NEEDED. DO NOT EXCEED 2 TABLETS IN 24 HOURS        Last Filled:  10/12/2020 27 tabs 0 refills     Patient Phone Number: [phone] (home) [phone] (work)    Last appt: 8/7/2019   Next appt: Visit date not found    Last OARRS: No flowsheet data found.     BREA BECKHAM    [facility]    Allergies    codeine (Hives)    Intolerances        PAST MEDICAL & SURGICAL HISTORY:  Dementia of frontal lobe type      Aphasic stroke      Diabetes mellitus      Respiratory failure      Hypertension      GERD (gastroesophageal reflux disease)      Constipation      Respiratory failure      CVA (cerebral vascular accident)      HTN (hypertension)      DM (diabetes mellitus)      Advanced dementia      COVID-19 virus detected      Quadriplegia      Pneumonia      Type II diabetes mellitus      Hx of appendectomy      Gastrostomy in place      Tracheostomy in place      Tracheostomy tube present      Feeding by G-tube          FAMILY HISTORY:  No pertinent family history in first degree relatives        Home Medications:  albuterol 90 mcg/inh inhalation aerosol with adapter: 2  inhaled every 6 hours (21 May 2022 21:16)  Bacid (LAC) oral tablet: 2 tab(s) by gastrostomy tube once a day (21 May 2022 21:16)  Betadine 10% topical swab: cleanse right and left great toes (21 May 2022 21:16)  Carafate 1 g/10 mL oral suspension: 10 milliliter(s) by gastrostomy tube 4 times a day (before meals and at bedtime) for 14 days (Started 6/4/21) (21 May 2022 21:16)  chlorhexidine 0.12% mucous membrane liquid: 15 milliliter(s) mucous membrane 2 times a day (21 May 2022 21:16)  Eucerin topical cream: Apply topically to affected area once a day bilateral feet (21 May 2022 21:16)  folic acid 1 mg oral tablet: 1 tab(s) orally once a day (21 May 2022 21:16)  insulin glargine 100 units/mL subcutaneous solution: 8 unit(s) subcutaneous once a day (in the morning) (01 Jun 2022 08:24)  ipratropium-albuterol 0.5 mg-2.5 mg/3 mL inhalation solution: 3 milliliter(s) inhaled 4 times a day (21 May 2022 21:16)  LORazepam 1 mg oral tablet: 1 tab(s) by gastrostomy tube every 4 hours (21 May 2022 21:16)  methocarbamol 500 mg oral tablet: 1 tab(s) by gastrostomy tube 2 times a day (21 May 2022 21:16)  MiraLax oral powder for reconstitution:  (21 May 2022 23:14)  Multiple Vitamins oral tablet: 1 tab(s) orally once a day (21 May 2022 21:16)  nystatin 100,000 units/g topical powder: 1 application topically 3 times a day (29 May 2022 16:35)  omeprazole 20 mg oral delayed release capsule: orally 2 times a day (21 May 2022 23:14)  polyethylene glycol 3350 oral powder for reconstitution: 17 gram(s) by gastrostomy tube every 12 hours (21 May 2022 21:16)  senna 8.6 mg oral tablet: 3 tab(s) by gastrostomy tube once a day (at bedtime) (21 May 2022 21:16)  simethicone 80 mg oral tablet, chewable: 1 tab(s) by gastrostomy tube every 6 hours (21 May 2022 21:16)  Tylenol 325 mg oral tablet: 2 tab(s) by gastrostomy tube once a day; 60 minutes prior to dressing change  (21 May 2022 21:16)  Tylenol 325 mg oral tablet: 2 tab(s) by gastrostomy tube every 6 hours, As Needed (21 May 2022 21:16)      MEDICATIONS  (STANDING):  chlorhexidine 0.12% Liquid 15 milliLiter(s) Oral Mucosa every 12 hours  collagenase Ointment 1 Application(s) Topical daily  dextrose 5%. 1000 milliLiter(s) (50 mL/Hr) IV Continuous <Continuous>  dextrose 5%. 1000 milliLiter(s) (100 mL/Hr) IV Continuous <Continuous>  dextrose 50% Injectable 25 Gram(s) IV Push once  dextrose 50% Injectable 12.5 Gram(s) IV Push once  dextrose 50% Injectable 25 Gram(s) IV Push once  ertapenem  IVPB 1000 milliGRAM(s) IV Intermittent once  ertapenem  IVPB      folic acid 1 milliGRAM(s) Oral daily  furosemide   Injectable 40 milliGRAM(s) IV Push daily  glucagon  Injectable 1 milliGRAM(s) IntraMuscular once  heparin   Injectable 5000 Unit(s) SubCutaneous every 8 hours  insulin glargine Injectable (LANTUS) 8 Unit(s) SubCutaneous every morning  insulin lispro (ADMELOG) corrective regimen sliding scale   SubCutaneous every 6 hours  lactobacillus acidophilus 1 Tablet(s) Oral daily  LORazepam     Tablet 2 milliGRAM(s) Oral every 4 hours  methocarbamol 500 milliGRAM(s) Oral two times a day  multivitamin 1 Tablet(s) Oral daily  nystatin Powder 1 Application(s) Topical every 12 hours  pantoprazole  Injectable 40 milliGRAM(s) IV Push daily  piperacillin/tazobactam IVPB.. 3.375 Gram(s) IV Intermittent every 8 hours  povidone iodine 10% Solution 1 Application(s) Topical daily  senna 3 Tablet(s) Oral at bedtime  simethicone 80 milliGRAM(s) Chew every 6 hours    MEDICATIONS  (PRN):  acetaminophen    Suspension .. 650 milliGRAM(s) Oral every 6 hours PRN Temp greater or equal to 38C (100.4F), Mild Pain (1 - 3)  albuterol/ipratropium for Nebulization 3 milliLiter(s) Nebulizer every 6 hours PRN Shortness of Breath and/or Wheezing  dextrose Oral Gel 15 Gram(s) Oral once PRN Blood Glucose LESS THAN 70 milliGRAM(s)/deciliter  LORazepam   Injectable 2 milliGRAM(s) IV Push every 4 hours PRN Agitation      Diet, NPO with Tube Feed:   Tube Feeding Modality: Gastrostomy  Glucerna 1.5 Horacio  Total Volume for 24 Hours (mL): 1200  Continuous  Until Goal Tube Feed Rate (mL per Hour): 60  Tube Feed Duration (in Hours): 20  Tube Feed Start Time: 00:00  Free Water Flush  Pump   Rate (mL per Hour): 25   Frequency: Every Hour    Duration (Hours): 24 (06-02-22 @ 11:46) [Active]          Vital Signs Last 24 Hrs  T(C): 36.4 (07 Jun 2022 08:18), Max: 37 (06 Jun 2022 17:15)  T(F): 97.6 (07 Jun 2022 08:18), Max: 98.6 (06 Jun 2022 17:15)  HR: 66 (07 Jun 2022 08:00) (57 - 71)  BP: 97/55 (07 Jun 2022 08:00) (95/58 - 141/52)  BP(mean): 68 (07 Jun 2022 08:00) (68 - 83)  RR: 24 (07 Jun 2022 08:00) (21 - 24)  SpO2: 100% (07 Jun 2022 08:00) (98% - 100%)      06-06-22 @ 07:01  -  06-07-22 @ 07:00  --------------------------------------------------------  IN: 2240 mL / OUT: 0 mL / NET: 2240 mL        Mode: AC/ CMV (Assist Control/ Continuous Mandatory Ventilation), RR (machine): 24, TV (machine): 0.4, FiO2: 30, PEEP: 5, ITime: 0.9, MAP: 14, PIP: 28      LABS:                        12.2   5.74  )-----------( 284      ( 07 Jun 2022 06:00 )             41.2     06-07    137  |  100  |  43<H>  ----------------------------<  177<H>  4.7   |  28  |  1.30    Ca    9.7      07 Jun 2022 06:00    TPro  8.5<H>  /  Alb  2.3<L>  /  TBili  0.5  /  DBili  x   /  AST  15  /  ALT  17  /  AlkPhos  137<H>  06-07              WBC:  WBC Count: 5.74 K/uL (06-07 @ 06:00)  WBC Count: 4.98 K/uL (06-06 @ 06:40)  WBC Count: 5.44 K/uL (06-05 @ 06:37)  WBC Count: 6.00 K/uL (06-04 @ 06:28)      MICROBIOLOGY:  RECENT CULTURES:  06-03 Catheterized Catheterized XXXX XXXX   <10,000 CFU/mL Normal Urogenital Elvira    06-03 .Blood Blood-Peripheral XXXX XXXX   No growth to date.    06-02 Trach Asp Tracheal Aspirate Serratia marcescens  Pseudomonas aeruginosa (Carbapenem Resistant)   Rare polymorphonuclear leukocytes per low power field  Rare Squamous epithelial cells per low power field  Moderate Gram Negative Rods per oil power field  Moderate Gram Negative Coccobacilli per oil power field   Numerous Serratia marcescens  Moderate Pseudomonas aeruginosa (Carbapenem Resistant)  Normal Respiratory Elvira present                    Sodium:  Sodium, Serum: 137 mmol/L (06-07 @ 06:00)  Sodium, Serum: 142 mmol/L (06-06 @ 06:40)  Sodium, Serum: 143 mmol/L (06-05 @ 06:37)  Sodium, Serum: 146 mmol/L (06-04 @ 06:28)      1.30 mg/dL 06-07 @ 06:00  1.14 mg/dL 06-06 @ 06:40  1.22 mg/dL 06-05 @ 06:37  1.46 mg/dL 06-04 @ 06:28      Hemoglobin:  Hemoglobin: 12.2 g/dL (06-07 @ 06:00)  Hemoglobin: 10.4 g/dL (06-06 @ 06:40)  Hemoglobin: 11.3 g/dL (06-05 @ 06:37)  Hemoglobin: 10.7 g/dL (06-04 @ 06:28)      Platelets: Platelet Count - Automated: 284 K/uL (06-07 @ 06:00)  Platelet Count - Automated: 252 K/uL (06-06 @ 06:40)  Platelet Count - Automated: 165 K/uL (06-05 @ 06:37)  Platelet Count - Automated: 232 K/uL (06-04 @ 06:28)      LIVER FUNCTIONS - ( 07 Jun 2022 06:00 )  Alb: 2.3 g/dL / Pro: 8.5 g/dL / ALK PHOS: 137 U/L / ALT: 17 U/L DA / AST: 15 U/L / GGT: x                 RADIOLOGY & ADDITIONAL STUDIES:      MICROBIOLOGY:  RECENT CULTURES:  06-03 Catheterized Catheterized XXXX XXXX   <10,000 CFU/mL Normal Urogenital Elvira    06-03 .Blood Blood-Peripheral XXXX XXXX   No growth to date.    06-02 Trach Asp Tracheal Aspirate Serratia marcescens  Pseudomonas aeruginosa (Carbapenem Resistant)   Rare polymorphonuclear leukocytes per low power field  Rare Squamous epithelial cells per low power field  Moderate Gram Negative Rods per oil power field  Moderate Gram Negative Coccobacilli per oil power field   Numerous Serratia marcescens  Moderate Pseudomonas aeruginosa (Carbapenem Resistant)  Normal Respiratory Elvira present

## 2022-06-20 ENCOUNTER — TELEPHONE (OUTPATIENT)
Dept: ADMINISTRATIVE | Age: 57
End: 2022-06-20

## 2022-06-20 DIAGNOSIS — G43.711 INTRACTABLE CHRONIC MIGRAINE WITHOUT AURA AND WITH STATUS MIGRAINOSUS: ICD-10-CM

## 2022-06-20 RX ORDER — UBROGEPANT 100 MG/1
100 TABLET ORAL PRN
Qty: 16 TABLET | Refills: 0 | Status: SHIPPED | OUTPATIENT
Start: 2022-06-20 | End: 2022-07-20

## 2022-06-20 NOTE — TELEPHONE ENCOUNTER
Submitted PA for UBRELVY  Via Count includes the [facility] KEY [id number] STATUS: APPROVED. LETTER ATTACHED. If this requires a response please respond to the pool. [address]). Please advise patient thank you.

## 2022-08-10 ENCOUNTER — PATIENT MESSAGE (OUTPATIENT)
Dept: FAMILY MEDICINE CLINIC | Age: 57
End: 2022-08-10

## 2022-08-10 DIAGNOSIS — E55.9 VITAMIN D DEFICIENCY: Primary | ICD-10-CM

## 2022-09-03 DIAGNOSIS — Z00.00 ANNUAL PHYSICAL EXAM: ICD-10-CM

## 2022-09-03 DIAGNOSIS — E55.9 VITAMIN D DEFICIENCY: ICD-10-CM

## 2022-09-03 LAB
A/G RATIO: 1.5 (ref 1.1–2.2)
ALBUMIN SERPL-MCNC: 4.3 G/DL (ref 3.4–5)
ALP BLD-CCNC: 79 U/L (ref 40–129)
ALT SERPL-CCNC: 13 U/L (ref 10–40)
ANION GAP SERPL CALCULATED.3IONS-SCNC: 10 MMOL/L (ref 3–16)
AST SERPL-CCNC: 16 U/L (ref 15–37)
BASOPHILS ABSOLUTE: 0 K/UL (ref 0–0.2)
BASOPHILS RELATIVE PERCENT: 0.6 %
BILIRUB SERPL-MCNC: 0.3 MG/DL (ref 0–1)
BUN BLDV-MCNC: 18 MG/DL (ref 7–20)
CALCIUM SERPL-MCNC: 9.4 MG/DL (ref 8.3–10.6)
CHLORIDE BLD-SCNC: 105 MMOL/L (ref 99–110)
CHOLESTEROL, TOTAL: 235 MG/DL (ref 0–199)
CO2: 28 MMOL/L (ref 21–32)
CREAT SERPL-MCNC: 0.6 MG/DL (ref 0.6–1.1)
EOSINOPHILS ABSOLUTE: 0.1 K/UL (ref 0–0.6)
EOSINOPHILS RELATIVE PERCENT: 2.8 %
GFR AFRICAN AMERICAN: >60
GFR NON-AFRICAN AMERICAN: >60
GLUCOSE BLD-MCNC: 95 MG/DL (ref 70–99)
HCT VFR BLD CALC: 40.4 % (ref 36–48)
HDLC SERPL-MCNC: 83 MG/DL (ref 40–60)
HEMOGLOBIN: 13.6 G/DL (ref 12–16)
LDL CHOLESTEROL CALCULATED: 136 MG/DL
LYMPHOCYTES ABSOLUTE: 1.1 K/UL (ref 1–5.1)
LYMPHOCYTES RELATIVE PERCENT: 26.5 %
MCH RBC QN AUTO: 31.5 PG (ref 26–34)
MCHC RBC AUTO-ENTMCNC: 33.7 G/DL (ref 31–36)
MCV RBC AUTO: 93.5 FL (ref 80–100)
MONOCYTES ABSOLUTE: 0.4 K/UL (ref 0–1.3)
MONOCYTES RELATIVE PERCENT: 8.6 %
NEUTROPHILS ABSOLUTE: 2.6 K/UL (ref 1.7–7.7)
NEUTROPHILS RELATIVE PERCENT: 61.5 %
PDW BLD-RTO: 13.2 % (ref 12.4–15.4)
PLATELET # BLD: 219 K/UL (ref 135–450)
PMV BLD AUTO: 7.3 FL (ref 5–10.5)
POTASSIUM SERPL-SCNC: 4.5 MMOL/L (ref 3.5–5.1)
RBC # BLD: 4.32 M/UL (ref 4–5.2)
SODIUM BLD-SCNC: 143 MMOL/L (ref 136–145)
TOTAL PROTEIN: 7.2 G/DL (ref 6.4–8.2)
TRIGL SERPL-MCNC: 79 MG/DL (ref 0–150)
TSH REFLEX: 3 UIU/ML (ref 0.27–4.2)
VITAMIN B-12: 1247 PG/ML (ref 211–911)
VITAMIN D 25-HYDROXY: 53.6 NG/ML
VLDLC SERPL CALC-MCNC: 16 MG/DL
WBC # BLD: 4.3 K/UL (ref 4–11)

## 2022-09-07 ENCOUNTER — OFFICE VISIT (OUTPATIENT)
Dept: FAMILY MEDICINE CLINIC | Age: 57
End: 2022-09-07
Payer: COMMERCIAL

## 2022-09-07 VITALS
HEIGHT: 63 IN | SYSTOLIC BLOOD PRESSURE: 122 MMHG | BODY MASS INDEX: 27.46 KG/M2 | DIASTOLIC BLOOD PRESSURE: 82 MMHG | HEART RATE: 84 BPM | WEIGHT: 155 LBS

## 2022-09-07 DIAGNOSIS — E78.5 HYPERLIPIDEMIA, UNSPECIFIED HYPERLIPIDEMIA TYPE: ICD-10-CM

## 2022-09-07 DIAGNOSIS — Z00.00 ANNUAL PHYSICAL EXAM: Primary | ICD-10-CM

## 2022-09-07 PROCEDURE — 99396 PREV VISIT EST AGE 40-64: CPT | Performed by: FAMILY MEDICINE

## 2022-09-07 RX ORDER — UBROGEPANT 100 MG/1
TABLET ORAL
COMMUNITY
Start: 2022-08-15 | End: 2022-10-10

## 2022-09-07 SDOH — ECONOMIC STABILITY: FOOD INSECURITY: WITHIN THE PAST 12 MONTHS, YOU WORRIED THAT YOUR FOOD WOULD RUN OUT BEFORE YOU GOT MONEY TO BUY MORE.: NEVER TRUE

## 2022-09-07 SDOH — ECONOMIC STABILITY: FOOD INSECURITY: WITHIN THE PAST 12 MONTHS, THE FOOD YOU BOUGHT JUST DIDN'T LAST AND YOU DIDN'T HAVE MONEY TO GET MORE.: NEVER TRUE

## 2022-09-07 ASSESSMENT — PATIENT HEALTH QUESTIONNAIRE - PHQ9
SUM OF ALL RESPONSES TO PHQ QUESTIONS 1-9: 0
SUM OF ALL RESPONSES TO PHQ9 QUESTIONS 1 & 2: 0
2. FEELING DOWN, DEPRESSED OR HOPELESS: 0
SUM OF ALL RESPONSES TO PHQ QUESTIONS 1-9: 0
1. LITTLE INTEREST OR PLEASURE IN DOING THINGS: 0

## 2022-09-07 ASSESSMENT — SOCIAL DETERMINANTS OF HEALTH (SDOH): HOW HARD IS IT FOR YOU TO PAY FOR THE VERY BASICS LIKE FOOD, HOUSING, MEDICAL CARE, AND HEATING?: NOT HARD AT ALL

## 2022-09-07 NOTE — PROGRESS NOTES
Chief Complaint:   Rafiq Hermosillo is a 59 y.o. female who presents for complete physical examination. History of Present Illness:    Migraines have been stable. Seeing chiropractor which she feels helps. Past Medical History:   Diagnosis Date    Allergic rhinitis due to dust 1/11/2017    Chronic migraine 1/10/2017    Frequent UTI     Mild obstructive sleep apnea     Overactive bladder     Vitamin D deficiency        Past Surgical History:   Procedure Laterality Date    CHOLECYSTECTOMY      COLONOSCOPY N/A 6/14/2019    COLONOSCOPY performed by Jerrell Orellana MD at [address] [place] [address]         Outpatient Medications Marked as Taking for the 9/7/22 encounter (Office Visit) with Juliana Pablo MD   Medication Sig Dispense Refill    UBRELVY 100 MG TABS TAKE 1 TABLET BY MOUTH AS NEEDED FOR MIGRAINES. TAKE AT ONSET OF MIGRAINE. MAY REPEAT DOSE IN 2 HOURS IF NOT EFFECTIVE. MAX 2 TABLETS IN 24 HOURS      SUMAtriptan Succinate 6 MG/0.5ML SOAJ INJECT 0.5 ML UNDER THE SKIN AS NEEDED FOR MIGRAINE. MAY REPEAT ONCE AFTER 1 HOUR. DO NOT EXCEED 2 DOSES IN 24 HOURS 3 mL 3    loperamide (IMODIUM) 2 MG capsule Take 2 mg by mouth 4 times daily as needed for Diarrhea      FIBER ADULT GUMMIES PO Take by mouth daily      fesoterodine (TOVIAZ) 4 MG TB24 ER tablet Take 4 mg by mouth daily      Cyanocobalamin (B-12) 1000 MCG CAPS Take by mouth daily       Cholecalciferol (VITAMIN D3) 5000 units TABS Take by mouth daily       fluticasone (FLONASE) 50 MCG/ACT nasal spray 2 sprays each nostril daily 1 Bottle 5    Aspirin-Acetaminophen-Caffeine (EXCEDRIN PO) Take  by mouth. Allergies   Allergen Reactions    Sulfa Antibiotics Hives     Pt has taken this previously with no complications, states she had hives the last time she took Bactrim.     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        Daughter Tram Abreu     Social Determinants of Health     Financial Resource Strain: Low Risk     Difficulty of Paying Living Expenses: Not hard at all   Food Insecurity: No Food Insecurity    Worried About Running Out of Food in the Last Year: Never true    Gila of Food in the Last Year: Never true       Family History   Problem Relation Age of Onset    Heart Disease Mother     High Blood Pressure Mother     Hypertension Mother     Diabetes Father     Heart Disease Father     Hypertension Father     Migraines Other     Stroke Other     Migraines Other     Dementia Other     Diabetes Other     Hypertension Other     Dementia Other     Alcohol Abuse Other     Cancer Other     Breast Cancer Other          Health Maintenance   Topic Date Due    Shingles vaccine (1 of 2) Never done    Cervical cancer screen  2020    Depression Screen  2022    COVID-19 Vaccine (3 - Booster for Pfizer series) 04/10/2022    Flu vaccine (1) 2022    Breast cancer screen  2024    DTaP/Tdap/Td vaccine (2 - Td or Tdap) 10/02/2025    Lipids  2027    Colorectal Cancer Screen  2029    Hepatitis A vaccine  Aged Out    Hepatitis B vaccine  Aged Out    Hib vaccine  Aged Out    Meningococcal (ACWY) vaccine  Aged Out    Pneumococcal 0-64 years Vaccine  Aged Out    Hepatitis C screen  Discontinued    HIV screen  Discontinued           Review Of Systems:  Skin: no changing moles, abnormal pigmentation, rash, scaling, itching, masses, hair or nail changes  Eyes: no blurring, diplopia, or eye pain  Ears/Nose/Throat: no hearing loss, tinnitus, vertigo, nosebleed, nasal congestion, rhinorrhea, sore throat  Respiratory: no cough, pleuritic chest pain, dyspnea, or wheezing  Cardiovascular: no angina, PINTO, orthopnea, PND, palpitations, or claudication  Gastrointestinal: no nausea, vomiting, heartburn, diarrhea, constipation, bloating, or abdominal pain  Genitourinary: no urinary urgency, frequency, dysuria, nocturia, hesitancy, or incontinence  Musculoskeletal: no arthritis, arthralgia, myalgia, weakness, or morning stiffness  Neurologic: no paralysis, paresis, paresthesia, seizures, tremors, or headaches  Hematologic/Lymphatic/Immunologic: no anemia, abnormal bleeding/bruising, fever, chills, night sweats, swollen glands, or unexplained weight loss  Endocrine: no heat or cold intolerance and no polyphagia, polydipsia, or polyuria    PHYSICAL EXAMINATION:  /82 (Site: Right Upper Arm, Position: Sitting, Cuff Size: Medium Adult)   Pulse 84   Ht 5' 3\" (1.6 m)   Wt 155 lb (70.3 kg)   LMP 08/10/2016   BMI 27.46 kg/m²   General appearance: healthy, alert, no distress  Skin: Skin color, texture, turgor normal. No rashes or suspicious lesions. No induration or tightening palpated. Head: Normocephalic. No masses, lesions, tenderness or abnormalities  Eyes: Conjunctivae/corneas clear. PERRL, EOM's intact. Ears: External ears normal. Canals clear. TM's clear bilaterally. Hearing grossly normal.  Nose/Sinuses: Nares normal.   Oropharynx: Lips, mucosa, and tongue normal. Teeth and gums normal. Oropharynx clear with no exudate seen. Neck: Neck supple, and symmetric. No adenopathy. Thyroid symmetric, normal size, without nodule. Trachea is midline. Back: Back symmetric, no curvature. ROM normal. No CVA tenderness. Lungs: Good diaphragmatic excursion. Lungs clear to auscultation bilaterally. No retractions or use of accessory muscles. Heart: PMI is not displaced, and no thrill noted. Regular rate and rhythm, with no rub, murmur or gallop noted. Breasts: Exam deferred to OB/GYN  Abdomen: Abdomen soft, non-tender. BS normal. No masses, organomegaly. No hernia noted.   Extremities: Extremities normal. No deformities, edema, or skin discoloration. No cyanosis or clubbing noted to the nails. Hands and feet were warm and well-perfused with palpable dorsalis pedis pulses bilaterally. Lymph: No lymphadenopathy of the neck or supraclavicular regions. Musculoskeletal: Spine ROM normal. Muscular strength intact. Neuro: Cranial nerves intact, gait normal. No focal weakness. Pelvic: Exam deferred to OB/GYN        ASSESSMENT/PLAN:  1. Annual physical exam    2. Hyperlipidemia, unspecified hyperlipidemia type  Dietary changes discussed  Recheck lipids in 6m   - Lipid Panel; Future      All health maintenance issues were updated.   Recommend begin progressive daily aerobic exercise program and follow a low fat, low cholesterol diet

## 2022-10-10 RX ORDER — UBROGEPANT 100 MG/1
TABLET ORAL
Qty: 16 TABLET | Refills: 3 | Status: SHIPPED | OUTPATIENT
Start: 2022-10-10

## 2022-10-10 NOTE — TELEPHONE ENCOUNTER
Medication:   Requested Prescriptions     Pending Prescriptions Disp Refills    UBRELVY 100 MG TABS [Pharmacy Med Name: UBRELVY 100MG TABLETS] 16 tablet      Sig: TAKE 1 TABLET BY MOUTH AS NEEDED FOR MIGRAINES. TAKE AT ONSET OF MIGRAINE. MAY REPEAT DOSE IN 2 HOURS IF NOT EFFECTIVE. MAX 2 TABLETS IN 24 HOURS        Last Filled:  unknwon     Patient Phone Number: [phone] (home) [phone] (work)    Last appt: 9/7/2022   Next appt: Visit date not found    Last OARRS: No flowsheet data found.

## 2022-10-16 ENCOUNTER — PATIENT MESSAGE (OUTPATIENT)
Dept: FAMILY MEDICINE CLINIC | Age: 57
End: 2022-10-16

## 2022-12-07 ENCOUNTER — OFFICE VISIT (OUTPATIENT)
Dept: FAMILY MEDICINE CLINIC | Age: 57
End: 2022-12-07
Payer: COMMERCIAL

## 2022-12-07 VITALS
HEART RATE: 86 BPM | WEIGHT: 153 LBS | BODY MASS INDEX: 27.11 KG/M2 | HEIGHT: 63 IN | SYSTOLIC BLOOD PRESSURE: 126 MMHG | OXYGEN SATURATION: 97 % | DIASTOLIC BLOOD PRESSURE: 78 MMHG

## 2022-12-07 DIAGNOSIS — G43.711 INTRACTABLE CHRONIC MIGRAINE WITHOUT AURA AND WITH STATUS MIGRAINOSUS: Primary | ICD-10-CM

## 2022-12-07 DIAGNOSIS — G43.711 INTRACTABLE CHRONIC MIGRAINE WITHOUT AURA AND WITH STATUS MIGRAINOSUS: ICD-10-CM

## 2022-12-07 PROCEDURE — 90674 CCIIV4 VAC NO PRSV 0.5 ML IM: CPT | Performed by: FAMILY MEDICINE

## 2022-12-07 PROCEDURE — 99213 OFFICE O/P EST LOW 20 MIN: CPT | Performed by: FAMILY MEDICINE

## 2022-12-07 PROCEDURE — 90471 IMMUNIZATION ADMIN: CPT | Performed by: FAMILY MEDICINE

## 2022-12-07 RX ORDER — CEFUROXIME AXETIL 250 MG/1
TABLET ORAL
Qty: 3 ML | Refills: 3 | Status: SHIPPED | OUTPATIENT
Start: 2022-12-07

## 2022-12-07 NOTE — TELEPHONE ENCOUNTER
Medication:   Requested Prescriptions     Pending Prescriptions Disp Refills    SUMAtriptan Succinate 6 MG/0.5ML SOAJ [Pharmacy Med Name: SUMATRIPTAN 6MG/0.5ML PF INJ2X0.5ML] 3 mL 3     Sig: INJECT 0.5 ML UNDER THE SKIN AS NEEDED FOR MIGRAINE. MAY REPEAT ONCE AFTER 1 HOUR. DO NOT EXCEED 2 DOSES IN 24 HOURS        Last Filled:      Patient Phone Number: [phone] (home) [phone] (work)    Last appt: 9/7/2022   Next appt: 12/7/2022    Last OARRS: No flowsheet data found.

## 2022-12-07 NOTE — PROGRESS NOTES
Kaia Cee is a 59 y.o. female. HPI:  Had severe migraine last night. Took imitrex IM which helped. Usually takes ubrelvy PRN which helps. Has been seeing chiropractor which has helped in the past year but has been sick a lot lately. Thinks this is triggering her migraines. ROS:  Gen:  Denies fever, chills, headaches. HEENT:  Denies cold symptoms, sore throat. CV:  Denies chest pain or tightness, palpitations. Pulm:  Denies shortness of breath, cough. Abd:  Denies abdominal pain, change in bowel habits. I have reviewed the patient's medical/surgical/family/social in detail and updated the computerized patient record as appropriate. Current Outpatient Medications   Medication Sig Dispense Refill    SUMAtriptan Succinate 6 MG/0.5ML SOAJ INJECT 0.5 ML UNDER THE SKIN AS NEEDED FOR MIGRAINE. MAY REPEAT ONCE AFTER 1 HOUR. DO NOT EXCEED 2 DOSES IN 24 HOURS 3 mL 3    UBRELVY 100 MG TABS TAKE 1 TABLET BY MOUTH AS NEEDED FOR MIGRAINES. TAKE AT ONSET OF MIGRAINE. MAY REPEAT DOSE IN 2 HOURS IF NOT EFFECTIVE. MAX 2 TABLETS IN 24 HOURS 16 tablet 3    loperamide (IMODIUM) 2 MG capsule Take 2 mg by mouth 4 times daily as needed for Diarrhea      FIBER ADULT GUMMIES PO Take by mouth daily      fesoterodine (TOVIAZ) 4 MG TB24 ER tablet Take 4 mg by mouth daily      Cyanocobalamin (B-12) 1000 MCG CAPS Take by mouth daily       Cholecalciferol (VITAMIN D3) 5000 units TABS Take by mouth daily       fluticasone (FLONASE) 50 MCG/ACT nasal spray 2 sprays each nostril daily 1 Bottle 5    Aspirin-Acetaminophen-Caffeine (EXCEDRIN PO) Take  by mouth. No current facility-administered medications for this visit.        Past Medical History:   Diagnosis Date    Allergic rhinitis due to dust 1/11/2017    Chronic migraine 1/10/2017    Frequent UTI     Mild obstructive sleep apnea     Overactive bladder     Vitamin D deficiency      Past Surgical History:   Procedure Laterality Date    CHOLECYSTECTOMY      COLONOSCOPY N/A 2019    COLONOSCOPY performed by Olesya Bailey MD at [address]       Family History   Problem Relation Age of Onset    Heart Disease Mother     High Blood Pressure Mother     Hypertension Mother     Diabetes Father     Heart Disease Father     Hypertension Father     Migraines Other     Stroke Other     Migraines Other     Dementia Other     Diabetes Other     Hypertension Other     Dementia Other     Alcohol Abuse Other     Cancer Other     Breast Cancer Other      Social History     Socioeconomic History    Marital status:      Spouse name: Not on file    Number of children: Not on file    Years of education: Not on file    Highest education level: Not on file   Occupational History    Not on file   Tobacco Use    Smoking status: Never    Smokeless tobacco: Never   Vaping Use    Vaping Use: Never used   Substance and Sexual Activity    Alcohol use: No    Drug use: No    Sexual activity: Not on file   Other Topics Concern    Not on file   Social History Narrative    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        Daughter Lamar Ball     Social Determinants of Health     Financial Resource Strain: Low Risk     Difficulty of Paying Living Expenses: Not hard at all   Food Insecurity: No Food Insecurity    Worried About Running Out of Food in the Last Year: Never true    Ran Out of Food in the Last Year: Never true   Transportation Needs: Not on file   Physical Activity: Not on file   Stress: Not on file   Social Connections: Not on file   Intimate Partner Violence: Not on file   Housing Stability: Not on file         OBJECTIVE:  /78 (Site: Right Upper Arm, Position: Sitting, Cuff Size: Medium Adult)   Pulse 86   Ht 5' 3\" (1.6 m)   Wt 153 lb (69.4 kg)   LMP 08/10/2016   SpO2 97%   BMI 27.10 kg/m²   GEN:  WDWN, NAD  HEENT:  NCAT, TM/OP nl, PERRL, EOMI. PSYCH: normal mood and affect. Intact judgement and insight  NEURO: A&O x 3. Normal gait.  CN II-XII intact    ASSESSMENT/PLAN:  1. Intractable chronic migraine without aura and with status migrainosus  Continue ubrelvy PRN.  Imitrex IM PRN for severe migraines  Will complete FMLA forms when available

## 2023-01-19 ENCOUNTER — TELEPHONE (OUTPATIENT)
Dept: SURGERY | Age: 58
End: 2023-01-19

## 2023-01-19 NOTE — TELEPHONE ENCOUNTER
Called and spoke with patient about upcoming MRI. She has received two different notices about what her cost may be, but she is nervous that it won't be covered and she will be stuck paying the full amount. She also spoke with her insurance ([employer]) and they told her this was not preventive but diagnostic, meaning it isn't covered. She is confused and has questions, but needed to get back to work and wants to take time to speak with someone when she has time. I gave her my name and let her know she could call me back on Monday when I return to the office or she could call tomorrow (Friday) and speak with Felicia.

## 2023-01-19 NOTE — TELEPHONE ENCOUNTER
Patient called in about her upcoming MRI appointment on 01/31/23. She has some questions about her imaging. She would like a call back from the nurse. Pt can be reached @[phone].

## 2023-01-25 NOTE — TELEPHONE ENCOUNTER
Patient called back again, questions/concerns with MRI for 01/31/23  If the MRI will be covered by insurance ([address]) at hospital setting or  satellite setting.   Patient can be reached at [phone]

## 2023-01-27 RX ORDER — UBROGEPANT 100 MG/1
TABLET ORAL
Qty: 16 TABLET | Refills: 3 | Status: SHIPPED | OUTPATIENT
Start: 2023-01-27

## 2023-01-27 RX ORDER — UBROGEPANT 100 MG/1
TABLET ORAL
Qty: 16 TABLET | Refills: 3 | Status: CANCELLED | OUTPATIENT
Start: 2023-01-27

## 2023-01-27 NOTE — TELEPHONE ENCOUNTER
Medication:   Requested Prescriptions     Pending Prescriptions Disp Refills    UBRELVY 100 MG TABS [Pharmacy Med Name: UBRELVY 100MG TABLETS] 16 tablet 3     Sig: TAKE 1 TABLET BY MOUTH AT ONSET OF MIGRAINE, MAY REPEAT DOSE IN TWO HOURS IF NOT EFFECTIVE. MAX IS 2 TABLETS IN 24 HOURS        Last Filled:  10/10/2020 #[id number]    Patient Phone Number: [phone] (home) [phone] (work)    Last appt: 12/7/2022   Next appt: Visit date not found    Last OARRS: No flowsheet data found.

## 2023-02-01 NOTE — TELEPHONE ENCOUNTER
Sent patient a [employer] message to give us a call back about her MRI questions. I also gave her the best time to reach me tomorrow (2/2/23) if patient returns my call please ask her to hold until I am free to speak with her.

## 2023-02-24 ENCOUNTER — OFFICE VISIT (OUTPATIENT)
Dept: SLEEP MEDICINE | Age: 58
End: 2023-02-24
Payer: COMMERCIAL

## 2023-02-24 VITALS
SYSTOLIC BLOOD PRESSURE: 130 MMHG | HEIGHT: 63 IN | DIASTOLIC BLOOD PRESSURE: 80 MMHG | HEART RATE: 86 BPM | TEMPERATURE: 98.1 F | OXYGEN SATURATION: 100 % | WEIGHT: 153.6 LBS | RESPIRATION RATE: 20 BRPM | BODY MASS INDEX: 27.21 KG/M2

## 2023-02-24 DIAGNOSIS — Z99.89 DEPENDENCE ON OTHER ENABLING MACHINES AND DEVICES: ICD-10-CM

## 2023-02-24 DIAGNOSIS — Z86.69 H/O MIGRAINE: ICD-10-CM

## 2023-02-24 DIAGNOSIS — G47.33 OSA ON CPAP: Primary | ICD-10-CM

## 2023-02-24 DIAGNOSIS — Z99.89 OSA ON CPAP: Primary | ICD-10-CM

## 2023-02-24 PROCEDURE — 99214 OFFICE O/P EST MOD 30 MIN: CPT | Performed by: PSYCHIATRY & NEUROLOGY

## 2023-02-24 ASSESSMENT — ENCOUNTER SYMPTOMS: APNEA: 0

## 2023-02-24 ASSESSMENT — SLEEP AND FATIGUE QUESTIONNAIRES
HOW LIKELY ARE YOU TO NOD OFF OR FALL ASLEEP WHILE WATCHING TV: 1
ESS TOTAL SCORE: 2
HOW LIKELY ARE YOU TO NOD OFF OR FALL ASLEEP WHILE SITTING QUIETLY AFTER LUNCH WITHOUT ALCOHOL: 0
HOW LIKELY ARE YOU TO NOD OFF OR FALL ASLEEP WHEN YOU ARE A PASSENGER IN A CAR FOR AN HOUR WITHOUT A BREAK: 1
HOW LIKELY ARE YOU TO NOD OFF OR FALL ASLEEP WHILE SITTING INACTIVE IN A PUBLIC PLACE: 0
HOW LIKELY ARE YOU TO NOD OFF OR FALL ASLEEP WHILE LYING DOWN TO REST IN THE AFTERNOON WHEN CIRCUMSTANCES PERMIT: 0
HOW LIKELY ARE YOU TO NOD OFF OR FALL ASLEEP WHILE SITTING AND TALKING TO SOMEONE: 0
HOW LIKELY ARE YOU TO NOD OFF OR FALL ASLEEP WHILE SITTING AND READING: 0
HOW LIKELY ARE YOU TO NOD OFF OR FALL ASLEEP IN A CAR, WHILE STOPPED FOR A FEW MINUTES IN TRAFFIC: 0

## 2023-02-24 NOTE — PROGRESS NOTES
Irene Rachana         : 1965        PHONE: [phone] (home) [phone] (work)  [x] [address]     [] [address]      [] [employer]     [] [employer]    [] [facility]  [] [facility]   [] [address] [place] [address]  [] [facility] [] Other:     Diagnosis: [x] THO (G47.33) [] CSA (G47.31) [] Apnea (G47.30)   Length of Need: [] 12 Months [x] 99 Months [] Other:    Machine (EMORY!): [] Respironics Dream Station      Auto [] ResMed AirSense     Auto [] Other:     []  CPAP () [] Bilevel ()   Mode: [] Auto [] Spontaneous    Mode: [] Auto [] Spontaneous                            Comfort Settings:   - Ramp Pressure: 5 cmH2O                                        - Ramp time: 15 min                                     -  Flex/EPR - 3 full time                                    - For ResMed Bilevel (TiMax-4 sec   TiMin- 0.2 sec)     Humidifier: [] Heated ()        [x] Water chamber replacement ()/ 1 per 6 months        Mask:   [x] Nasal () /1 per 3 months [] Full Face () /1 per 3 months   [x] Patient choice -Size and fit mask [] Patient Choice - Size and fit mask   [] Dispense:  [] Dispense:    [x] Headgear () / 1 per 3 months [] Headgear () / 1 per 3 months   [x] Replacement Nasal Cushion ()/2 per month [] Interface Replacement ()/1 per month   [x] Replacement Nasal Pillows ()/2 per month         Tubing: [x] Heated ()/1 per 3 months    [] Standard ()/1 per 3 months [] Other:           Filters: [x] Non-disposable ()/1 per 6 months     [x] Ultra-Fine, Disposable ()/2 per month        Miscellaneous: [x] Chin Strap ()/ 1 per 6 months [] O2 bleed-in:       LPM   [] Oximetry on CPAP/Bilevel []  Other:          Start Order Date: 23    MEDICAL JUSTIFICATION:  I, the undersigned, certify that the above prescribed supplies are medically necessary for this patients wellbeing.   In my opinion, the supplies are both reasonable and necessary in reference to accepted standards of medicalpractice in treatment of this patients condition.     Josh Francois MD      NPI: [id number]       Order Signed Date: 02/24/23    Electronically signed by Josh Francois MD on 2/24/2023 at 8:27 AM

## 2023-02-24 NOTE — PROGRESS NOTES
MD MANDIE Mendoza Board Certified in Sleep Medicine  Certified in Behavioral Sleep Medicine  Board Certified in Neurology [facility]  [address]   [address]  [place]  [address]  [phone]   [facility]  [address]  [address]  [place], [place] [address]                      [facility] [place] [facility]IC[facility]  [address] [facility] [address] [phone]    Subjective:     Patient ID: Andressa Membreno is a 57 y.o. female.    Chief Complaint   Patient presents with    Follow-up    Sleep Apnea         HPI:        Andressa Membreno is a 57 y.o. female was seen today as a follow for mild obstructive sleep apnea with apnea hypopnea index of 5.0/hr with lowest O2 saturation of 91%, patient spent about 0 minutes below 90%. (weight was 157 pounds 12/21/2019).   Patient is using the PAP machine about 64% of the time, more than 4 hours a nightabout  47 %, in total average of 5:25 hours a night in last 90 days.  Currently on PAP at 9.8 cm (5-15), the AHI is only 1.0 events per hour at this pressure.  Patient improved regarding daytime sleepiness and fatigue, wakes up refreshed in the morning.  The Patient scored Mcleod Sleepiness Score: 2 on Mcleod Sleepiness Scale ( more than 10 is indicative of daytime sleepiness)   Patient has no problem with PAP pressure or mask, uses nasal pillow mask.   Wakes up in the morning with dry mouth, the setting of the heated humidifier at # auto.  Migraine is stable, but sometimes she wakes up with MHA and can not use the machine. Has lost 3 pounds since last visit.   DOT/CDL - N/A        Previous Report(s)Reviewed: historical medical records         Social History     Socioeconomic History    Marital status:      Spouse name: Not on file    Number of children: Not on file    Years of education: Not on file    Highest education level: Not  on file   Occupational History    Not on file   Tobacco Use    Smoking status: Never     Passive exposure: Never    Smokeless tobacco: Never   Vaping Use    Vaping Use: Never used   Substance and Sexual Activity    Alcohol use: No    Drug use: No    Sexual activity: Not on file   Other Topics Concern    Not on file   Social History Narrative    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 Authorizing Provider   UBRELVY 100 MG TABS TAKE 1 TABLET BY MOUTH AT ONSET OF MIGRAINE, MAY REPEAT DOSE IN TWO HOURS IF NOT EFFECTIVE. MAX IS 2 TABLETS IN 24 HOURS 23  Yes Jennifer Charles MD   loperamide (IMODIUM) 2 MG capsule Take 2 mg by mouth 4 times daily as needed for Diarrhea   Yes Historical Provider, MD   [address] Take by mouth daily   Yes Historical Provider, MD   fesoterodine (TOVIAZ) 4 MG TB24 ER tablet Take 4 mg by mouth daily   Yes Historical Provider, MD   Cyanocobalamin (B-12) 1000 MCG CAPS Take by mouth daily    Yes Historical Provider, MD   Cholecalciferol (VITAMIN D3) 5000 units TABS Take by mouth daily    Yes Historical Provider, MD   Aspirin-Acetaminophen-Caffeine (EXCEDRIN PO) Take  by mouth. Yes Historical Provider, MD   SUMAtriptan Succinate 6 MG/0.5ML SOAJ INJECT 0.5 ML UNDER THE SKIN AS NEEDED FOR MIGRAINE. MAY REPEAT ONCE AFTER 1 HOUR.  DO NOT EXCEED 2 DOSES IN 24 HOURS  Patient not taking: Reported on 2/24/2023 12/7/22   Hernán Hodge MD   fluticasone (FLONASE) 50 MCG/ACT nasal spray 2 sprays each nostril daily  Patient not taking: Reported on 2/24/2023 1/10/17   Hermilo Davila MD       Allergies as of 02/24/2023 - Fully Reviewed 02/24/2023   Allergen Reaction Noted    Sulfa antibiotics Hives 02/05/2019    Ciprofloxacin Other (See Comments) 11/15/2018       Patient Active Problem List   Diagnosis    Allergic rhinitis due to dust    Intractable chronic migraine without aura and with status migrainosus    Overactive bladder    Solar lentiginosis    Multiple benign melanocytic nevi    Mild obstructive sleep apnea       Past Medical History:   Diagnosis Date    Allergic rhinitis due to dust 1/11/2017    Chronic migraine 1/10/2017    Frequent UTI     Mild obstructive sleep apnea     Overactive bladder     Vitamin D deficiency        Past Surgical History:   Procedure Laterality Date    CHOLECYSTECTOMY      COLONOSCOPY N/A 6/14/2019    COLONOSCOPY performed by Armen Delacruz MD at [address] [place] [address]         Family History   Problem Relation Age of Onset    Heart Disease Mother     High Blood Pressure Mother     Hypertension Mother     Diabetes Father     Heart Disease Father     Hypertension Father     Migraines Other     Stroke Other     Migraines Other     Dementia Other     Diabetes Other     Hypertension Other     Dementia Other     Alcohol Abuse Other     Cancer Other     Breast Cancer Other        Review of Systems   Constitutional:  Negative for fatigue. Respiratory:  Negative for apnea. Cardiovascular:  Negative for leg swelling. Neurological:  Negative for headaches. Objective:     Vitals:  Weight BMI Neck circumference    Wt Readings from Last 3 Encounters:   02/24/23 153 lb 9.6 oz (69.7 kg)   12/07/22 153 lb (69.4 kg)   09/07/22 155 lb (70.3 kg)    Body mass index is 27.21 kg/m².        BP HR SaO2   BP Readings from Last 3 Encounters:   02/24/23 130/80   12/07/22 126/78   09/07/22 122/82    Pulse Readings from Last 3 Encounters:   02/24/23 86   12/07/22 86   09/07/22 84    SpO2 Readings from Last 3 Encounters:   02/24/23 100%   12/07/22 97%   04/05/22 98%        Themandibular molar Class :   [x]1 []2 []3      Mallampati I Airway Classification:   []1 []2 [x]3 []4      Physical Exam  Vitals and nursing note reviewed. Constitutional:       Appearance: Normal appearance. Cardiovascular:      Rate and Rhythm: Normal rate and regular rhythm. Heart sounds: Normal heart sounds. Pulmonary:      Effort: Pulmonary effort is normal.      Breath sounds: Normal breath sounds. Musculoskeletal:      Right lower leg: No edema. Left lower leg: No edema.       :   Mild Obstructive Sleep Apnea/Hypopnea Syndrome under good control on PAP at 9.8 cmwp. Diagnosis Orders   1. THO on CPAP        2. Dependence on other enabling machines and devices        3. H/O migraine          Plan: Will continue the PAP at 5-15 cmwp. I encouraged the patient to use the PAP on nightly basis. I educated the Patient about the PAP machine including how to change the heated humidifier. I will order PAP supplies, mask, filters. ... Most likely treating the THO will have positive impact on MHA control. No orders of the defined types were placed in this encounter. Return in about 2 years (around 2/24/2025) for Reveiwing CPAP usage and compliance report and tro.     Luisito Dowell MD  Medical Director - [facility]

## 2023-03-04 ENCOUNTER — HOSPITAL ENCOUNTER (OUTPATIENT)
Dept: MAMMOGRAPHY | Age: 58
Discharge: HOME | End: 2023-03-04
Payer: COMMERCIAL

## 2023-03-04 DIAGNOSIS — Z91.89 AT HIGH RISK FOR BREAST CANCER: ICD-10-CM

## 2023-03-04 DIAGNOSIS — Z12.31 VISIT FOR SCREENING MAMMOGRAM: ICD-10-CM

## 2023-03-04 DIAGNOSIS — Z80.3 FAMILY HISTORY OF BREAST CANCER: ICD-10-CM

## 2023-03-04 PROCEDURE — 77063 BREAST TOMOSYNTHESIS BI: CPT

## 2023-03-15 ENCOUNTER — TELEPHONE (OUTPATIENT)
Dept: PULMONOLOGY | Age: 58
End: 2023-03-15

## 2023-03-15 NOTE — TELEPHONE ENCOUNTER
Jovanni Whiting is going to fly and go on a cruise. She would like a letter of medical necessity from Dr Bridger Kamara to bring her CPAP machine on the plane and cruise. She would like to pick it up at [address] on 3/24. Thank you!

## 2023-03-16 NOTE — TELEPHONE ENCOUNTER
Letter is signed and in the [facility] binder. Notified Queta Divers who will go [place] day.

## 2023-05-22 RX ORDER — UBROGEPANT 100 MG/1
TABLET ORAL
Qty: 16 TABLET | Refills: 3 | OUTPATIENT
Start: 2023-05-22

## 2023-05-22 RX ORDER — UBROGEPANT 100 MG/1
TABLET ORAL
Qty: 16 TABLET | Refills: 0 | Status: SHIPPED | OUTPATIENT
Start: 2023-05-22

## 2023-05-22 NOTE — TELEPHONE ENCOUNTER
Medication:   Requested Prescriptions     Pending Prescriptions Disp Refills    UBRELVY 100 MG TABS [Pharmacy Med Name: UBRELVY 100MG TABLETS] 16 tablet 3     Sig: TAKE 1 TABLET BY MOUTH AT ONSET OF MIGRAINE. MAY REPEAT DOSE IN 2 HOURS IF NOT EFFECTIVE. MAX IS 2 TABLETS IN 24 HOURS        Last Filled:      Patient Phone Number: [phone] (home) [phone] (work)    Last appt: 12/7/2022   Next appt: Visit date not found    Last OARRS: No flowsheet data found.

## 2023-05-25 ENCOUNTER — PATIENT MESSAGE (OUTPATIENT)
Dept: FAMILY MEDICINE CLINIC | Age: 58
End: 2023-05-25

## 2023-05-31 ENCOUNTER — PATIENT MESSAGE (OUTPATIENT)
Dept: FAMILY MEDICINE CLINIC | Age: 58
End: 2023-05-31

## 2023-05-31 NOTE — TELEPHONE ENCOUNTER
From: [facility]  To: Dr. Ghada Douglass: 5/31/2023 2:11 PM EDT  Subject: Yessenia Gonzalez I talked with insurance never did get PA last week . They are sending a form today on the [employer]. This is their ph number  and fax is [phone] . Thanks for your help on this. Let me know if get this message    Ronan Terrazas .

## 2023-05-31 NOTE — TELEPHONE ENCOUNTER
I've started a paper prior authorization & created a letter of medical necessity for Ubrelvy. I placed on Dr. Ana Paula Cooley desk to be signed. Once signed I will fax to [address] update the patient as soon as I receive a response.

## 2023-06-01 NOTE — TELEPHONE ENCOUNTER
Magdalena Guevara Letter of Medical Necessity & Prior Authorization faxed to [employer] (Antelmo Burnett) 5/31/2023. Waiting for insurance determination. No further action is needed at this time; thank you!

## 2023-06-05 ENCOUNTER — TELEPHONE (OUTPATIENT)
Dept: FAMILY MEDICINE CLINIC | Age: 58
End: 2023-06-05

## 2023-06-05 ENCOUNTER — PATIENT MESSAGE (OUTPATIENT)
Dept: FAMILY MEDICINE CLINIC | Age: 58
End: 2023-06-05

## 2023-06-05 NOTE — TELEPHONE ENCOUNTER
Pt calling again about the pre auth    Ins phone [phone], fax [phone]    The ins needs more information.

## 2023-06-05 NOTE — TELEPHONE ENCOUNTER
From: Fernanda Kruse  To: Dr. Ramos Pepeekeo: 6/5/2023 9:36 AM EDT  Subject: Prior Auth    Dr. Gold Brown just following up on my migraine med Noelle Frank . The prior Auth was sent in over a week ago and still waiting on it. I know it was sent over to insur as twice. I believe they have it now but will need med very soon . Thanks for your help on this .      Amaris Eddy

## 2023-06-05 NOTE — TELEPHONE ENCOUNTER
Pt advised: I faxed some additional documents they were requesting today. I'll follow up with them tomorrow to check on the progress.

## 2023-06-22 RX ORDER — UBROGEPANT 100 MG/1
TABLET ORAL
Qty: 16 TABLET | Refills: 0 | Status: SHIPPED | OUTPATIENT
Start: 2023-06-22

## 2023-06-22 NOTE — TELEPHONE ENCOUNTER
Medication:   Requested Prescriptions     Pending Prescriptions Disp Refills    UBRELVY 100 MG TABS [Pharmacy Med Name: UBRELVY 100MG TABLETS] 16 tablet 0     Sig: TAKE 1 TABLET BY MOUTH AT ONSET OF MIGRAINE. MAY REPEAT DOSE IN 2 HOURS IF NOT EFFECTIVE. MAX IS 2 TABLETS IN 24 HOURS        Last Filled:  5/22/2023 16 tabs 0 refills     Patient Phone Number: [phone] (home) [phone] (work)    Last appt: 12/7/2022   Next appt: Visit date not found    Last OARRS: No flowsheet data found.

## 2023-06-27 ENCOUNTER — PATIENT MESSAGE (OUTPATIENT)
Dept: FAMILY MEDICINE CLINIC | Age: 58
End: 2023-06-27

## 2023-07-21 RX ORDER — UBROGEPANT 100 MG/1
TABLET ORAL
Qty: 16 TABLET | Refills: 0 | Status: SHIPPED | OUTPATIENT
Start: 2023-07-21

## 2023-07-21 NOTE — TELEPHONE ENCOUNTER
Medication:   Requested Prescriptions     Pending Prescriptions Disp Refills    UBRELVY 100 MG TABS [Pharmacy Med Name: UBRELVY 100MG TABLETS] 16 tablet 0     Sig: TAKE 1 TABLET BY MOUTH AT ONSET OF MIGRAINE. MAY REPEAT DOSE IN 2 HOURS IF NOT EFFECTIVE. MAX IS 2 TABLETS IN 24 HOURS        Last Filled:  06/22/2023 #16 0rf     Patient Phone Number: [phone] (home) [phone] (work)    Last appt: 12/7/2022   Next appt: Visit date not found    Last OARRS: No flowsheet data found.

## 2023-07-24 ENCOUNTER — TELEPHONE (OUTPATIENT)
Dept: FAMILY MEDICINE CLINIC | Age: 58
End: 2023-07-24

## 2023-07-24 RX ORDER — UBROGEPANT 100 MG/1
TABLET ORAL
Qty: 16 TABLET | Refills: 0 | OUTPATIENT
Start: 2023-07-24

## 2023-07-24 NOTE — TELEPHONE ENCOUNTER
Office has been notified that pt is requiring Prior Authorization for the following medication:  Bartolome Belcher    Please initiate this request through CoverMyMeds, contacting the following Payor/Insurance:  --     Please see the documentation attached scanned  to this encounter for any additional information that may assist in processing PA:  --     Thank you!

## 2023-07-25 NOTE — TELEPHONE ENCOUNTER
Submitted PA for UBRELVY  [facility]  Key: [id number] STATUS:  Jennifer Barron has an active PA on file which is expiring on 06/04/2024 and has 9 no. of fills remaining. \"    Follow up done daily; if no response in three days we will refax for status check. If another three days goes by with no response we will call the insurance for status. If this requires a response please respond to the pool. [facility] [place]). Please advise patient thank you.

## 2023-07-26 ENCOUNTER — PATIENT MESSAGE (OUTPATIENT)
Dept: FAMILY MEDICINE CLINIC | Age: 58
End: 2023-07-26

## 2023-07-26 DIAGNOSIS — Z00.00 ANNUAL PHYSICAL EXAM: Primary | ICD-10-CM

## 2023-08-01 ENCOUNTER — PATIENT MESSAGE (OUTPATIENT)
Dept: FAMILY MEDICINE CLINIC | Age: 58
End: 2023-08-01

## 2023-08-21 RX ORDER — UBROGEPANT 100 MG/1
TABLET ORAL
Qty: 16 TABLET | Refills: 0 | Status: SHIPPED | OUTPATIENT
Start: 2023-08-21

## 2023-08-21 NOTE — TELEPHONE ENCOUNTER
Medication:   Requested Prescriptions     Pending Prescriptions Disp Refills    UBRELVY 100 MG TABS [Pharmacy Med Name: UBRELVY 100MG TABLETS] 16 tablet 0     Sig: TAKE 1 TABLET BY MOUTH AT ONSET OF MIGRAINE. MAY REPEAT DOSE IN 2 HOURS IF NOT EFFECTIVE. MAX IS 2 TABLETS IN 24 HOURS        Last Filled:      Patient Phone Number: [phone] (home) [phone] (work)    Last appt: 12/7/2022   Next appt: 9/20/2023    Last OARRS: No flowsheet data found.

## 2023-09-08 ENCOUNTER — PATIENT MESSAGE (OUTPATIENT)
Dept: FAMILY MEDICINE CLINIC | Age: 58
End: 2023-09-08

## 2023-09-08 DIAGNOSIS — G43.711 INTRACTABLE CHRONIC MIGRAINE WITHOUT AURA AND WITH STATUS MIGRAINOSUS: ICD-10-CM

## 2023-09-08 DIAGNOSIS — E55.9 VITAMIN D DEFICIENCY: ICD-10-CM

## 2023-09-08 DIAGNOSIS — Z00.00 ANNUAL PHYSICAL EXAM: Primary | ICD-10-CM

## 2023-09-16 DIAGNOSIS — Z00.00 ANNUAL PHYSICAL EXAM: ICD-10-CM

## 2023-09-16 DIAGNOSIS — G43.711 INTRACTABLE CHRONIC MIGRAINE WITHOUT AURA AND WITH STATUS MIGRAINOSUS: ICD-10-CM

## 2023-09-16 DIAGNOSIS — E55.9 VITAMIN D DEFICIENCY: ICD-10-CM

## 2023-09-16 LAB
25(OH)D3 SERPL-MCNC: 37 NG/ML
ALBUMIN SERPL-MCNC: 4.5 G/DL (ref 3.4–5)
ALBUMIN/GLOB SERPL: 1.6 {RATIO} (ref 1.1–2.2)
ALP SERPL-CCNC: 86 U/L (ref 40–129)
ALT SERPL-CCNC: 11 U/L (ref 10–40)
ANION GAP SERPL CALCULATED.3IONS-SCNC: 7 MMOL/L (ref 3–16)
AST SERPL-CCNC: 14 U/L (ref 15–37)
BASOPHILS # BLD: 0 K/UL (ref 0–0.2)
BASOPHILS NFR BLD: 0.7 %
BILIRUB SERPL-MCNC: 0.4 MG/DL (ref 0–1)
BUN SERPL-MCNC: 15 MG/DL (ref 7–20)
CALCIUM SERPL-MCNC: 9.6 MG/DL (ref 8.3–10.6)
CHLORIDE SERPL-SCNC: 101 MMOL/L (ref 99–110)
CHOLEST SERPL-MCNC: 189 MG/DL (ref 0–199)
CO2 SERPL-SCNC: 30 MMOL/L (ref 21–32)
CREAT SERPL-MCNC: 0.6 MG/DL (ref 0.6–1.2)
DEPRECATED RDW RBC AUTO: 13.5 % (ref 12.4–15.4)
EOSINOPHIL # BLD: 0.1 K/UL (ref 0–0.6)
EOSINOPHIL NFR BLD: 2.5 %
EST. AVERAGE GLUCOSE BLD GHB EST-MCNC: 96.8 MG/DL
GFR SERPLBLD CREATININE-BSD FMLA CKD-EPI: >60 ML/MIN/{1.73_M2}
GLUCOSE SERPL-MCNC: 84 MG/DL (ref 70–99)
HBA1C MFR BLD: 5 %
HCT VFR BLD AUTO: 39.1 % (ref 36–48)
HDLC SERPL-MCNC: 78 MG/DL (ref 40–60)
HGB BLD-MCNC: 13.8 G/DL (ref 12–16)
LDLC SERPL CALC-MCNC: 98 MG/DL
LYMPHOCYTES # BLD: 1.2 K/UL (ref 1–5.1)
LYMPHOCYTES NFR BLD: 25.6 %
MCH RBC QN AUTO: 32.1 PG (ref 26–34)
MCHC RBC AUTO-ENTMCNC: 35.3 G/DL (ref 31–36)
MCV RBC AUTO: 91.1 FL (ref 80–100)
MONOCYTES # BLD: 0.5 K/UL (ref 0–1.3)
MONOCYTES NFR BLD: 10.2 %
NEUTROPHILS # BLD: 2.8 K/UL (ref 1.7–7.7)
NEUTROPHILS NFR BLD: 61 %
PLATELET # BLD AUTO: 232 K/UL (ref 135–450)
PMV BLD AUTO: 7.7 FL (ref 5–10.5)
POTASSIUM SERPL-SCNC: 4.8 MMOL/L (ref 3.5–5.1)
PROT SERPL-MCNC: 7.4 G/DL (ref 6.4–8.2)
RBC # BLD AUTO: 4.3 M/UL (ref 4–5.2)
SODIUM SERPL-SCNC: 138 MMOL/L (ref 136–145)
TRIGL SERPL-MCNC: 63 MG/DL (ref 0–150)
TSH SERPL DL<=0.005 MIU/L-ACNC: 3.59 UIU/ML (ref 0.27–4.2)
VIT B12 SERPL-MCNC: 631 PG/ML (ref 211–911)
VLDLC SERPL CALC-MCNC: 13 MG/DL
WBC # BLD AUTO: 4.6 K/UL (ref 4–11)

## 2023-09-18 ASSESSMENT — PATIENT HEALTH QUESTIONNAIRE - PHQ9
SUM OF ALL RESPONSES TO PHQ9 QUESTIONS 1 & 2: 0
1. LITTLE INTEREST OR PLEASURE IN DOING THINGS: NOT AT ALL
2. FEELING DOWN, DEPRESSED OR HOPELESS: NOT AT ALL
SUM OF ALL RESPONSES TO PHQ QUESTIONS 1-9: 0
SUM OF ALL RESPONSES TO PHQ9 QUESTIONS 1 & 2: 0
SUM OF ALL RESPONSES TO PHQ QUESTIONS 1-9: 0
1. LITTLE INTEREST OR PLEASURE IN DOING THINGS: 0
2. FEELING DOWN, DEPRESSED OR HOPELESS: 0

## 2023-09-20 ENCOUNTER — OFFICE VISIT (OUTPATIENT)
Dept: FAMILY MEDICINE CLINIC | Age: 58
End: 2023-09-20
Payer: COMMERCIAL

## 2023-09-20 VITALS
HEIGHT: 63 IN | BODY MASS INDEX: 26.93 KG/M2 | HEART RATE: 88 BPM | OXYGEN SATURATION: 90 % | DIASTOLIC BLOOD PRESSURE: 76 MMHG | WEIGHT: 152 LBS | SYSTOLIC BLOOD PRESSURE: 122 MMHG

## 2023-09-20 DIAGNOSIS — Z00.00 ANNUAL PHYSICAL EXAM: Primary | ICD-10-CM

## 2023-09-20 DIAGNOSIS — G43.711 INTRACTABLE CHRONIC MIGRAINE WITHOUT AURA AND WITH STATUS MIGRAINOSUS: ICD-10-CM

## 2023-09-20 DIAGNOSIS — E78.5 HYPERLIPIDEMIA, UNSPECIFIED HYPERLIPIDEMIA TYPE: ICD-10-CM

## 2023-09-20 PROCEDURE — 99396 PREV VISIT EST AGE 40-64: CPT | Performed by: FAMILY MEDICINE

## 2023-09-20 NOTE — PROGRESS NOTES
Chief Complaint:   Lupe Amor is a 59 y.o. female who presents for complete physical examination. History of Present Illness:    Migraines have been stable. Going to chiropractor which helps. Catrachita Paulino is helping for PRN. Also has imitrex SQ for severe headaches. Past Medical History:   Diagnosis Date    Allergic rhinitis due to dust 01/11/2017    Chronic migraine 01/10/2017    Frequent UTI     Mild obstructive sleep apnea     Multiple benign melanocytic nevi 06/26/2019    THO (obstructive sleep apnea)     Overactive bladder     Solar lentiginosis 06/26/2019    Vitamin D deficiency        Past Surgical History:   Procedure Laterality Date    CHOLECYSTECTOMY      COLONOSCOPY N/A 6/14/2019    COLONOSCOPY performed by Brian Souza MD at [address] [place] [address]         Outpatient Medications Marked as Taking for the 9/20/23 encounter (Office Visit) with Dinorah Pederson MD   Medication Sig Dispense Refill    UBRELVY 100 MG TABS TAKE 1 TABLET BY MOUTH AT ONSET OF MIGRAINE. MAY REPEAT DOSE IN 2 HOURS IF NOT EFFECTIVE. MAX IS 2 TABLETS IN 24 HOURS 16 tablet 0    SUMAtriptan Succinate 6 MG/0.5ML SOAJ INJECT 0.5 ML UNDER THE SKIN AS NEEDED FOR MIGRAINE. MAY REPEAT ONCE AFTER 1 HOUR. DO NOT EXCEED 2 DOSES IN 24 HOURS 3 mL 3    loperamide (IMODIUM) 2 MG capsule Take 1 capsule by mouth 4 times daily as needed for Diarrhea      FIBER ADULT GUMMIES PO Take by mouth daily      fesoterodine (TOVIAZ) 4 MG TB24 ER tablet Take 1 tablet by mouth daily      Cyanocobalamin (B-12) 1000 MCG CAPS Take by mouth daily       Cholecalciferol (VITAMIN D3) 5000 units TABS Take by mouth daily       fluticasone (FLONASE) 50 MCG/ACT nasal spray 2 sprays each nostril daily 1 Bottle 5    Aspirin-Acetaminophen-Caffeine (EXCEDRIN PO) Take  by mouth. Allergies   Allergen Reactions    Sulfa Antibiotics Hives     Pt has taken this previously with no complications, states she had hives the last time she took Bactrim.

## 2023-09-21 NOTE — TELEPHONE ENCOUNTER
Medication:   Requested Prescriptions     Pending Prescriptions Disp Refills    UBRELVY 100 MG TABS [Pharmacy Med Name: UBRELVY 100MG TABLETS] 16 tablet 0     Sig: TAKE 1 TABLET BY MOUTH AT ONSET OF MIGRAINE. MAY REPEAT DOSE IN 2 HOURS IF NOT EFFECTIVE.  MAX IS 2 TABLETS IN 24 HOURS        Last Filled:  8/21/2023    Patient Phone Number: [phone] (home) [phone] (work)    Last appt: 9/20/2023   Next appt: Visit date not found    Last OARRS:        No data to display

## 2023-09-22 RX ORDER — UBROGEPANT 100 MG/1
TABLET ORAL
Qty: 16 TABLET | Refills: 0 | Status: SHIPPED | OUTPATIENT
Start: 2023-09-22

## 2023-10-03 ENCOUNTER — PATIENT MESSAGE (OUTPATIENT)
Dept: FAMILY MEDICINE CLINIC | Age: 58
End: 2023-10-03

## 2023-10-05 NOTE — TELEPHONE ENCOUNTER
[facility] Provider Screening Form has been faxed and uploaded into media with fax confirmation (as well as sent to patient on "[employer]t). No further action is needed at this time; thank you!

## 2023-10-24 ENCOUNTER — PATIENT MESSAGE (OUTPATIENT)
Dept: FAMILY MEDICINE CLINIC | Age: 58
End: 2023-10-24

## 2023-10-24 RX ORDER — UBROGEPANT 100 MG/1
TABLET ORAL
Qty: 16 TABLET | Refills: 0 | Status: SHIPPED | OUTPATIENT
Start: 2023-10-24

## 2023-10-24 NOTE — TELEPHONE ENCOUNTER
Medication:   Requested Prescriptions     Pending Prescriptions Disp Refills    Ubrogepant (UBRELVY) 100 MG TABS 16 tablet 0     Sig: TAKE 1 TABLET BY MOUTH AT ONSET OF MIGRAINE. MAY REPEAT DOSE IN 2 HOURS IF NOT EFFECTIVE.  MAX IS 2 TABLETS IN 24 HOURS        Last Filled:  09/22/2023 #16 0rf    Patient Phone Number: [phone] (home) [phone] (work)    Last appt: 9/20/2023   Next appt: Visit date not found    Last OARRS:        No data to display

## 2023-10-24 NOTE — TELEPHONE ENCOUNTER
From: Lady Suero  To: Dr. Karie Joshi: 10/24/2023 8:43 AM EDT  Subject: Refill    Dr. Brittanie Rm is trying to refill my Lonni Idania that is up for a refill. Just making sure you got the request from them?  Thank you     Janet Husain

## 2023-11-14 DIAGNOSIS — G43.711 INTRACTABLE CHRONIC MIGRAINE WITHOUT AURA AND WITH STATUS MIGRAINOSUS: ICD-10-CM

## 2023-11-14 NOTE — TELEPHONE ENCOUNTER
Medication:   Requested Prescriptions     Pending Prescriptions Disp Refills    SUMAtriptan (IMITREX) 6 MG/0.5ML SOAJ injection syringe [Pharmacy Med Name: SUMATRIPTAN 6MG/0.5ML PF INJ2X0.5ML] 3 mL 3     Sig: INJECT 0.5 ML UNDER THE SKIN AS NEEDED FOR MIGRAINE. MAY REPEAT ONCE AFTER 1 HOUR.  DO NOT EXCEED 2 DOSES IN 24 HOURS        Last Filled:  12/07/2022 #1 3rf    Patient Phone Number: [phone] (home) [phone] (work)    Last appt: 9/20/2023   Next appt: Visit date not found    Last OARRS:        No data to display

## 2023-11-15 RX ORDER — CEFUROXIME AXETIL 250 MG/1
TABLET ORAL
Qty: 3 ML | Refills: 3 | Status: SHIPPED | OUTPATIENT
Start: 2023-11-15

## 2023-11-21 RX ORDER — UBROGEPANT 100 MG/1
TABLET ORAL
Qty: 16 TABLET | Refills: 0 | Status: SHIPPED | OUTPATIENT
Start: 2023-11-21

## 2023-11-21 NOTE — TELEPHONE ENCOUNTER
Medication:   Requested Prescriptions     Pending Prescriptions Disp Refills    UBRELVY 100 MG TABS [Pharmacy Med Name: UBRELVY 100MG TABLETS] 16 tablet 0     Sig: TAKE 1 TABLET BY MOUTH AT ONSET OF MIGRAINE. MAY REPEAT DOSE IN 2 HOURS IF NOT EFFECTIVE.  MAX IS 2 TABLETS IN 24 HOURS        Last Filled:  10/24/2023 #16 0rf    Patient Phone Number: [phone] (home) [phone] (work)    Last appt: 9/20/2023   Next appt: 11/27/2023    Last OARRS:        No data to display

## 2023-11-27 ENCOUNTER — NURSE ONLY (OUTPATIENT)
Dept: FAMILY MEDICINE CLINIC | Age: 58
End: 2023-11-27
Payer: COMMERCIAL

## 2023-11-27 DIAGNOSIS — Z23 NEED FOR INFLUENZA VACCINATION: Primary | ICD-10-CM

## 2023-11-27 PROCEDURE — 90674 CCIIV4 VAC NO PRSV 0.5 ML IM: CPT | Performed by: FAMILY MEDICINE

## 2023-11-27 PROCEDURE — 90471 IMMUNIZATION ADMIN: CPT | Performed by: FAMILY MEDICINE

## 2024-01-19 RX ORDER — UBROGEPANT 100 MG/1
TABLET ORAL
Qty: 16 TABLET | Refills: 0 | Status: SHIPPED | OUTPATIENT
Start: 2024-01-19

## 2024-01-19 NOTE — TELEPHONE ENCOUNTER
Medication:   Requested Prescriptions     Pending Prescriptions Disp Refills    UBRELVY 100 MG TABS [Pharmacy Med Name: UBRELVY 100MG TABLETS] 16 tablet 0     Sig: TAKE 1 TABLET BY MOUTH AT ONSET OF MIGRAINE. MAY REPEAT DOSE IN 2 HOURS IF NOT EFFECTIVE. MAX IS 2 TABLETS IN 24 HOURS        Last Filled:  12/20/2023 #16 0rf     Patient Phone Number: [phone] (home) [phone] (work)    Last appt: 9/20/2023   Next appt: Visit date not found    Last OARRS:        No data to display

## 2024-01-24 ENCOUNTER — TELEMEDICINE (OUTPATIENT)
Dept: PRIMARY CARE CLINIC | Age: 59
End: 2024-01-24
Payer: COMMERCIAL

## 2024-01-24 DIAGNOSIS — L30.9 ECZEMA, UNSPECIFIED TYPE: Primary | ICD-10-CM

## 2024-01-24 DIAGNOSIS — R21 RASH AND NONSPECIFIC SKIN ERUPTION: ICD-10-CM

## 2024-01-24 DIAGNOSIS — S90.511A ABRASION OF RIGHT ANKLE, INITIAL ENCOUNTER: ICD-10-CM

## 2024-01-24 PROCEDURE — 99213 OFFICE O/P EST LOW 20 MIN: CPT | Performed by: NURSE PRACTITIONER

## 2024-01-24 RX ORDER — MIRABEGRON 50 MG/1
50 TABLET, FILM COATED, EXTENDED RELEASE ORAL DAILY
COMMUNITY
Start: 2024-01-19

## 2024-01-24 RX ORDER — TRIAMCINOLONE ACETONIDE 0.25 MG/G
CREAM TOPICAL
Qty: 15 G | Refills: 0 | Status: SHIPPED | OUTPATIENT
Start: 2024-01-24 | End: 2024-01-24

## 2024-01-24 RX ORDER — TRIAMCINOLONE ACETONIDE 0.25 MG/G
1 CREAM TOPICAL 2 TIMES DAILY PRN
Qty: 15 G | Refills: 0 | Status: SHIPPED | OUTPATIENT
Start: 2024-01-24

## 2024-01-24 ASSESSMENT — ENCOUNTER SYMPTOMS
GASTROINTESTINAL NEGATIVE: 1
RESPIRATORY NEGATIVE: 1

## 2024-01-24 NOTE — PATIENT INSTRUCTIONS
If there is no improvement/worsening of condition please contact the office for further follow up.  Use cool compresses to area.  May use Benadryl if needed for itching. Do not use steroid cream if you do not need to.  Avoid irritating substances.  Use cream and medication as prescribed.  Avoiding itching if at all possible.  Use gloves when necessary, use mild soap like dove or Cetaphil soap and dry well. Use moisturizers as a barrier. (Aquaphor, etc.)   Follow up with PCP in 3-4 days if not improving or sooner if worsening.  Please refer to educational handout with AVS.

## 2024-01-24 NOTE — PROGRESS NOTES
[facility] Primary Care Virtualist Encounter Note       Chief Complaint   Patient presents with    Rash     Symptoms stated x1 week ago, pt c/o red pinpoint rash on her R ankle, itchy, denies pain.        HPI:    Andressa Membreno (:  1965) has requested an audio/video evaluation for the following concern(s):    This is an established patient of Dr. Campa's. This is the first time I am seeing the patient today. She requested this visit for rash to right ankle. Started about a week ago. It was rash slight redness, itchy and she had been scratching thinking she had a bite, and she was using OTC hydrocortisone cream which helped some. She thought she may have been bit by something and cleaned her bedding. She has had not exposure to anything, had a change to a medication but it is specific to this one spot, it feels rough and scaly, there are tiny scabbed bumps, most likely from scratching, it is not painful, no blisters, denies fever/chills. she does have some tiny spider veins and when itching wonder if that might also have been a reason. She has not had itching today or used the Hydrocortisone cream today. Most likely Eczema. Differential diagnosis include but not limited to: insect bite, contact dermatitis, shingles.     Review of Systems   Constitutional:  Negative for chills, fatigue and fever.   Respiratory: Negative.     Cardiovascular: Negative.    Gastrointestinal: Negative.    Genitourinary: Negative.    Musculoskeletal:  Negative for gait problem.   Skin:  Positive for rash (right ankle).       Prior to Visit Medications    Medication Sig Taking? Authorizing Provider   MYRBETRIQ 50 MG TB24 Take 50 mg by mouth daily Yes ProviderOvidio MD   mupirocin (BACTROBAN) 2 % ointment Apply topically 2 times daily. Yes Chela Murphy, APRN - CNP   triamcinolone (KENALOG) 0.025 % cream Apply 1 Application topically 2 times daily as needed (for itching/rash) To right ankle Yes Chela Murphy

## 2024-01-31 ENCOUNTER — TELEPHONE (OUTPATIENT)
Dept: SURGERY | Age: 59
End: 2024-01-31

## 2024-01-31 DIAGNOSIS — Z91.89 AT HIGH RISK FOR BREAST CANCER: ICD-10-CM

## 2024-01-31 DIAGNOSIS — R92.333 HETEROGENEOUSLY DENSE TISSUE OF BOTH BREASTS ON MAMMOGRAPHY: Primary | ICD-10-CM

## 2024-01-31 NOTE — TELEPHONE ENCOUNTER
Patient called in wanting to make sure that she wanted to make sure that she had an order for her to cole an MRI w/wo contrast. Order in [facility] is . Needs a new order. Offered to schedule MRI. Patient wants to look at some dates to see when she is available to have her MRI. Will call our office back or central scheduling to get scheduled.     Please place new order for MRI.

## 2024-01-31 NOTE — TELEPHONE ENCOUNTER
Left message letting patient know a new MRI order was put in [facility] and to call if she had any questions.

## 2024-02-07 ENCOUNTER — PATIENT MESSAGE (OUTPATIENT)
Dept: FAMILY MEDICINE CLINIC | Age: 59
End: 2024-02-07

## 2024-02-07 RX ORDER — UBROGEPANT 100 MG/1
TABLET ORAL
Qty: 16 TABLET | Refills: 0 | Status: SHIPPED | OUTPATIENT
Start: 2024-02-07

## 2024-02-07 NOTE — TELEPHONE ENCOUNTER
From: Andressa Membreno  To: Dr. Larisa Campa  Sent: 2/7/2024 4:47 PM EST  Subject: Medicine refill to different pharmacy    Jazmyn I have to have my Ubrelvy 100mg filled through [facility] mail order. Their ph is  . Not [facility]. So if a new script could be sent there now. Thanks for your help. Letting you know now for mail time . Thank you  Andressa Membreno

## 2024-02-07 NOTE — TELEPHONE ENCOUNTER
Medication:   Requested Prescriptions     Pending Prescriptions Disp Refills    Ubrogepant (UBRELVY) 100 MG TABS 16 tablet 0        Last Filled:      Patient Phone Number: [phone] (home) [phone] (work)    Last appt: 9/20/2023   Next appt: Visit date not found    Last OARRS:        No data to display

## 2024-03-11 RX ORDER — UBROGEPANT 100 MG/1
TABLET ORAL
Qty: 16 TABLET | Refills: 0 | Status: SHIPPED | OUTPATIENT
Start: 2024-03-11

## 2024-03-11 NOTE — TELEPHONE ENCOUNTER
Medication:   Requested Prescriptions     Pending Prescriptions Disp Refills    Ubrogepant (UBRELVY) 100 MG TABS [Pharmacy Med Name: UBRELVY 100MG TABS] 16 tablet 0     Sig: TAKE ONE TABLET BY MOUTH AT ONSET OF HEADACHE. MAY REPEAT DOSE IN 2 HOURS IF NO RELIEF. DO NOT EXCEED TWO TABLETS IN 24 HOURS        Last Filled:      Patient Phone Number: [phone] (home) [phone] (work)    Last appt: 9/20/2023   Next appt: Visit date not found    Last OARRS:        No data to display

## 2024-04-06 ENCOUNTER — HOSPITAL ENCOUNTER (OUTPATIENT)
Dept: MAMMOGRAPHY | Age: 59
Discharge: HOME | End: 2024-04-06
Payer: COMMERCIAL

## 2024-04-06 VITALS — HEIGHT: 63 IN | WEIGHT: 150 LBS | BODY MASS INDEX: 26.58 KG/M2

## 2024-04-06 DIAGNOSIS — Z12.31 VISIT FOR SCREENING MAMMOGRAM: ICD-10-CM

## 2024-04-06 PROCEDURE — 77063 BREAST TOMOSYNTHESIS BI: CPT

## 2024-04-09 RX ORDER — UBROGEPANT 100 MG/1
TABLET ORAL
Qty: 16 TABLET | Refills: 0 | Status: SHIPPED | OUTPATIENT
Start: 2024-04-09

## 2024-04-09 NOTE — TELEPHONE ENCOUNTER
Medication:   Requested Prescriptions     Pending Prescriptions Disp Refills    Ubrogepant (UBRELVY) 100 MG TABS [Pharmacy Med Name: UBRELVY 100MG TABS] 16 tablet 0     Sig: TAKE 1 TABLET BY MOUTH AT ONSET OF HEADACHE. MAY REPEAT DOSE IN 2 HOURS IF NO RELIEF, DO NOT EXCEED 2 TABLETS IN 24 HOURS        Last Filled:  03/11/2024 #16 0rf     Patient Phone Number: [phone] (home) [phone] (work)    Last appt: 9/20/2023   Next appt: Visit date not found    Last OARRS:        No data to display

## 2024-04-12 NOTE — TELEPHONE ENCOUNTER
LMOVM to return our call( NP Questions). I have personally seen and examined the patient. I have collaborated with and supervised the

## 2024-04-19 ENCOUNTER — OFFICE VISIT (OUTPATIENT)
Dept: SURGERY | Age: 59
End: 2024-04-19
Payer: COMMERCIAL

## 2024-04-19 ENCOUNTER — HOSPITAL ENCOUNTER (OUTPATIENT)
Dept: ULTRASOUND IMAGING | Age: 59
End: 2024-04-19
Payer: COMMERCIAL

## 2024-04-19 ENCOUNTER — HOSPITAL ENCOUNTER (OUTPATIENT)
Dept: WOMENS IMAGING | Age: 59
Discharge: HOME | End: 2024-04-19
Payer: COMMERCIAL

## 2024-04-19 VITALS
BODY MASS INDEX: 27.25 KG/M2 | DIASTOLIC BLOOD PRESSURE: 84 MMHG | WEIGHT: 153.8 LBS | OXYGEN SATURATION: 98 % | HEIGHT: 63 IN | RESPIRATION RATE: 17 BRPM | SYSTOLIC BLOOD PRESSURE: 124 MMHG | HEART RATE: 88 BPM

## 2024-04-19 VITALS — HEIGHT: 63 IN | WEIGHT: 150 LBS | BODY MASS INDEX: 26.58 KG/M2

## 2024-04-19 DIAGNOSIS — R92.8 ABNORMAL MAMMOGRAM OF BOTH BREASTS: ICD-10-CM

## 2024-04-19 DIAGNOSIS — Z80.3 FAMILY HISTORY OF BREAST CANCER: ICD-10-CM

## 2024-04-19 DIAGNOSIS — Z12.31 VISIT FOR SCREENING MAMMOGRAM: ICD-10-CM

## 2024-04-19 DIAGNOSIS — Z91.89 AT HIGH RISK FOR BREAST CANCER: Primary | ICD-10-CM

## 2024-04-19 PROCEDURE — 99213 OFFICE O/P EST LOW 20 MIN: CPT | Performed by: SURGERY

## 2024-04-19 PROCEDURE — G0279 TOMOSYNTHESIS, MAMMO: HCPCS

## 2024-04-19 NOTE — PROGRESS NOTES
4/19/2024    Chief Complaint   Patient presents with    1 Year Follow Up     1yr f/u had abnormal imaging w/ additional imaging at [facility] today        HPI Patient is here for high risk for breast cancer follow up. She reports a history of multiple cyst aspirations by Dr. Black in the past.  As she has gone through menopause these cysts have become much less bothersome to her. Patient has not felt any breast masses, no breast pain or nipple discharge. Using the TC model, her lifetime risk of developing breast cancer is 21%.      Bilateral diagnostic mammogram today BIR[facility] 2C      Current Outpatient Medications:     Ubrogepant (UBRELVY) 100 MG TABS, TAKE 1 TABLET BY MOUTH AT ONSET OF HEADACHE. MAY REPEAT DOSE IN 2 HOURS IF NO RELIEF, DO NOT EXCEED 2 TABLETS IN 24 HOURS, Disp: 16 tablet, Rfl: 0    MYRBETRIQ 50 MG TB24, Take 50 mg by mouth daily, Disp: , Rfl:     triamcinolone (KENALOG) 0.025 % cream, Apply 1 Application topically 2 times daily as needed (for itching/rash) To right ankle, Disp: 15 g, Rfl: 0    SUMAtriptan (IMITREX) 6 MG/0.5ML SOAJ injection syringe, INJECT 0.5 ML UNDER THE SKIN AS NEEDED FOR MIGRAINE. MAY REPEAT ONCE AFTER 1 HOUR. DO NOT EXCEED 2 DOSES IN 24 HOURS, Disp: 3 mL, Rfl: 3    loperamide (IMODIUM) 2 MG capsule, Take 1 capsule by mouth 4 times daily as needed for Diarrhea, Disp: , Rfl:     FIBER ADULT GUMMIES PO, Take by mouth daily, Disp: , Rfl:     Cyanocobalamin (B-12) 1000 MCG CAPS, Take by mouth daily , Disp: , Rfl:     Cholecalciferol (VITAMIN D3) 5000 units TABS, Take by mouth daily , Disp: , Rfl:     fluticasone (FLONASE) 50 MCG/ACT nasal spray, 2 sprays each nostril daily, Disp: 1 Bottle, Rfl: 5    Aspirin-Acetaminophen-Caffeine (EXCEDRIN PO), Take  by mouth., Disp: , Rfl:       Past Medical History:   Diagnosis Date    Allergic rhinitis due to dust 01/11/2017    Chronic migraine 01/10/2017    Frequent UTI     Mild obstructive sleep apnea     Multiple benign melanocytic nevi

## 2024-04-19 NOTE — PATIENT INSTRUCTIONS
Mammogram reviewed, no concerning findings  Breast exam performed, small cysts were noted. No change.     Continue self breast exams    Healthy Lifestyle Recommendations: healthy diet (decrease consumption of red meat, increase fresh fruits and vegetables), decreased alcohol consumption (less than 4 drinks/week), adequate sleep (goal 6-8 hours), routine exercise (goal 150 minutes/week or greater), weight control.     Return:  Schedule MRI, Will call with MRI results. Follow up in 1 year with mammogram.

## 2024-04-30 NOTE — TELEPHONE ENCOUNTER
Medication:   Requested Prescriptions     Pending Prescriptions Disp Refills    Ubrogepant (UBRELVY) 100 MG TABS [Pharmacy Med Name: UBRELVY 100MG TABS] 16 tablet 0     Sig: TAKE 1 TABLET BY MOUTH AT ONSET OF HEADACHE. MAY REPEAT DOSE IN 2 HOURS IF NO RELIEF. MAX DAILY DOSE 2 TABLETS        Last Filled:  04/09/2024 #16 0rf    Patient Phone Number: [phone] (home) [phone] (work)    Last appt: 9/20/2023   Next appt: Visit date not found    Last OARRS:        No data to display

## 2024-05-01 RX ORDER — UBROGEPANT 100 MG/1
TABLET ORAL
Qty: 16 TABLET | Refills: 0 | Status: SHIPPED | OUTPATIENT
Start: 2024-05-01

## 2024-05-06 ENCOUNTER — TELEPHONE (OUTPATIENT)
Dept: FAMILY MEDICINE CLINIC | Age: 59
End: 2024-05-06

## 2024-05-06 NOTE — TELEPHONE ENCOUNTER
SUBMITTED PA FOR Ubrelvy 100MG tablets  VIA [facility] Key: [id number] STATUS PENDING.      FOLLOW UP DONE DAILY: IF NO RESPONSE IN 3 DAYS WE WILL REFAX FOR STATUS CHECK. IF ANOTHER 3 DAYS GOES BY WITH NO RESPONSE WILL CALL INSURANCE FOR STATUS.

## 2024-05-07 NOTE — TELEPHONE ENCOUNTER
The medication is APPROVED. LETTER AVAILABLE IN MEDIA  APPROVED 05/06/2024 - 05/05/2025    If this requires a response please respond to the pool ( P MHCX PSC MEDICATION PRE-AUTH).      Thank you please advise patient.

## 2024-05-09 NOTE — TELEPHONE ENCOUNTER
LMOM & sent Urbfult message advising patient to contact pharmacy to discuss p/u for Ubrelvy 100MG tablets as insurance approved authorization

## 2024-05-28 RX ORDER — UBROGEPANT 100 MG/1
TABLET ORAL
Qty: 16 TABLET | Refills: 0 | Status: SHIPPED | OUTPATIENT
Start: 2024-05-28

## 2024-05-28 NOTE — TELEPHONE ENCOUNTER
Medication:   Requested Prescriptions     Pending Prescriptions Disp Refills    Ubrogepant (UBRELVY) 100 MG TABS [Pharmacy Med Name: UBRELVY 100MG TABS] 16 tablet 0     Sig: TAKE 1 TABLET BY MOUTH AT ONSET OF HEADACHE. MAY REPEAT DOSE IN 2 HOURS IF NO RELIEF. MAX DAILY AMOUNT: 2 TABLETS        Last Filled:  05/01/2024 #16 0rf     Patient Phone Number: [phone] (home) [phone] (work)    Last appt: 9/20/2023   Next appt: Visit date not found    Last OARRS:        No data to display

## 2024-06-04 ENCOUNTER — TELEMEDICINE (OUTPATIENT)
Dept: FAMILY MEDICINE CLINIC | Age: 59
End: 2024-06-04
Payer: COMMERCIAL

## 2024-06-04 DIAGNOSIS — G43.709 CHRONIC MIGRAINE WITHOUT AURA WITHOUT STATUS MIGRAINOSUS, NOT INTRACTABLE: Primary | ICD-10-CM

## 2024-06-04 PROCEDURE — 99213 OFFICE O/P EST LOW 20 MIN: CPT | Performed by: FAMILY MEDICINE

## 2024-06-04 SDOH — ECONOMIC STABILITY: HOUSING INSECURITY
IN THE LAST 12 MONTHS, WAS THERE A TIME WHEN YOU DID NOT HAVE A STEADY PLACE TO SLEEP OR SLEPT IN A SHELTER (INCLUDING NOW)?: NO

## 2024-06-04 SDOH — ECONOMIC STABILITY: FOOD INSECURITY: WITHIN THE PAST 12 MONTHS, YOU WORRIED THAT YOUR FOOD WOULD RUN OUT BEFORE YOU GOT MONEY TO BUY MORE.: NEVER TRUE

## 2024-06-04 SDOH — ECONOMIC STABILITY: FOOD INSECURITY: WITHIN THE PAST 12 MONTHS, THE FOOD YOU BOUGHT JUST DIDN'T LAST AND YOU DIDN'T HAVE MONEY TO GET MORE.: NEVER TRUE

## 2024-06-04 SDOH — ECONOMIC STABILITY: INCOME INSECURITY: HOW HARD IS IT FOR YOU TO PAY FOR THE VERY BASICS LIKE FOOD, HOUSING, MEDICAL CARE, AND HEATING?: NOT HARD AT ALL

## 2024-06-04 ASSESSMENT — PATIENT HEALTH QUESTIONNAIRE - PHQ9
SUM OF ALL RESPONSES TO PHQ9 QUESTIONS 1 & 2: 0
2. FEELING DOWN, DEPRESSED OR HOPELESS: NOT AT ALL
SUM OF ALL RESPONSES TO PHQ QUESTIONS 1-9: 0
1. LITTLE INTEREST OR PLEASURE IN DOING THINGS: NOT AT ALL
SUM OF ALL RESPONSES TO PHQ QUESTIONS 1-9: 0

## 2024-06-04 NOTE — PROGRESS NOTES
TELEHEALTH EVALUATION -- Audio/Visual   Andressa Membreno (:  1965) has requested to and consented to an audio/video evaluation for the concerns or medical issues discussed below.    Andressa was evaluated through a synchronous (real-time) audio-video encounter. The patient (or guardian if applicable) is aware that this is a billable service, which includes applicable co-pays. This Virtual Visit was conducted with patient's (and/or legal guardian's) consent. The visit was conducted pursuant to the emergency declaration under the Avery Act and the National Emergencies Act, 1135 waiver authority and the Coronavirus Preparedness and Response Supplemental Appropriations Act.  Patient identification was verified, and a caregiver was present when appropriate.   The patient was located at Home: [address] [place] [address].   Provider was located at Home (Appt Dept State): [place].        Patient identification was verified at the start of the visit and patient has verbally consented to a telehealth visit: Yes    Total time spent on this encounter: Not billed by time.      Services were provided through a video synchronous discussion virtually to substitute for in-person clinic visit.       Andressa Membreno   YOB: 1965    Date of Visit:  2024    Allergies   Allergen Reactions    Sulfa Antibiotics Hives     Pt has taken this previously with no complications, states she had hives the last time she took Bactrim.    Ciprofloxacin Other (See Comments)     Muscle aches      Outpatient Medications Marked as Taking for the 24 encounter (Telemedicine) with Jacki Alvarez MD   Medication Sig Dispense Refill    Ubrogepant (UBRELVY) 100 MG TABS TAKE 1 TABLET BY MOUTH AT ONSET OF HEADACHE. MAY REPEAT DOSE IN 2 HOURS IF NO RELIEF. MAX DAILY AMOUNT: 2 TABLETS 16 tablet 0    MYRBETRIQ 50 MG TB24 Take 50 mg by mouth daily      triamcinolone (KENALOG) 0.025 % cream Apply 1 Application

## 2024-06-27 RX ORDER — UBROGEPANT 100 MG/1
TABLET ORAL
Qty: 16 TABLET | Refills: 0 | Status: SHIPPED | OUTPATIENT
Start: 2024-06-27

## 2024-06-27 NOTE — TELEPHONE ENCOUNTER
Medication:   Requested Prescriptions     Pending Prescriptions Disp Refills    UBRELVY 100 MG TABS [Pharmacy Med Name: UBRELVY 100MG TABS] 16 tablet 0     Sig: TAKE 1 TABLET BY MOUTH AT ONSET OF HEADACHE. MAY REPEAT DOSE IN 2 HOURS IF NO RELIEF. MAX DAILY AMOUNT: 2 TABLETS        Last Filled:  05/28/2024 #16 0rf    Patient Phone Number: [phone] (home) [phone] (work)    Last appt: 6/4/2024   Next appt: Visit date not found    Last OARRS:        No data to display

## 2024-07-30 RX ORDER — UBROGEPANT 100 MG/1
TABLET ORAL
Qty: 16 TABLET | Refills: 0 | Status: SHIPPED | OUTPATIENT
Start: 2024-07-30

## 2024-07-30 NOTE — TELEPHONE ENCOUNTER
Medication:   Requested Prescriptions     Pending Prescriptions Disp Refills    UBRELVY 100 MG TABS [Pharmacy Med Name: UBRELVY 100MG TABS] 16 tablet 0     Sig: TAKE 1 TABLET BY MOUTH AT ONSET OF HEADACHE. MAY REPEAT DOSE IN 2 HOURS IF NO RELIEF. MAX DAILY AMOUNT: 2 TABLETS        Last Filled:  06/27/2024 #16 0rf     Patient Phone Number: [phone] (home) [phone] (work)    Last appt: 6/4/2024   Next appt: Visit date not found    Last OARRS:        No data to display

## 2024-09-03 DIAGNOSIS — G43.709 CHRONIC MIGRAINE WITHOUT AURA WITHOUT STATUS MIGRAINOSUS, NOT INTRACTABLE: Primary | ICD-10-CM

## 2024-09-03 NOTE — TELEPHONE ENCOUNTER
Medication:   Requested Prescriptions     Pending Prescriptions Disp Refills    Ubrogepant (UBRELVY) 100 MG TABS [Pharmacy Med Name: UBRELVY 100MG TABS] 16 tablet 0     Sig: TAKE ONE TABLET BY MOUTH AT ONSET OF HEADACHE MAY REPEAT DOSE IN 2 HOURS IF NO RELIEF. MAX DAILY DOSE 2 TABLETS        Last Filled:  07/30/2024 #116 0rf     Patient Phone Number: [phone] (home) [phone] (work)    Last appt: 6/4/2024   Next appt: 9/18/2024    Last OARRS:        No data to display

## 2024-09-04 RX ORDER — UBROGEPANT 100 MG/1
TABLET ORAL
Qty: 16 TABLET | Refills: 0 | Status: SHIPPED | OUTPATIENT
Start: 2024-09-04

## 2024-09-13 NOTE — ANESTHESIA PRE PROCEDURE
[facility] Department of Anesthesiology  Pre-Anesthesia Evaluation/Consultation       Name:  Sin Montez  : 1965  Age:  62 y.o. MRN:  [id number]  Date: 2019           Surgeon: Surgeon(s):  Virginia Holland MD    Procedure: Procedure(s):  COLONOSCOPY     Allergies   Allergen Reactions    Sulfa Antibiotics Hives     Pt has taken this previously with no complications, states she had hives the last time she took Bactrim.  Ciprofloxacin Other (See Comments)     Muscle aches      Patient Active Problem List   Diagnosis    Allergic rhinitis due to dust    Intractable chronic migraine without aura and with status migrainosus    Overactive bladder     Past Medical History:   Diagnosis Date    Allergic rhinitis due to dust 2017    Chronic migraine 1/10/2017    Frequent UTI     Overactive bladder     Vitamin D deficiency      Past Surgical History:   Procedure Laterality Date    CHOLECYSTECTOMY      CYSTOSCOPY       Social History     Tobacco Use    Smoking status: Never Smoker    Smokeless tobacco: Never Used   Substance Use Topics    Alcohol use: No    Drug use: No     Medications  No current facility-administered medications on file prior to encounter. Current Outpatient Medications on File Prior to Encounter   Medication Sig Dispense Refill    acetaminophen (TYLENOL) 500 MG tablet Take 500 mg by mouth every 6 hours as needed for Pain      SUMAtriptan (IMITREX) 100 MG tablet TAKE 1 TABLET BY MOUTH AT ONSET OF HEADACHE, MAY REPEAT ONCE IN 2 HOURS IF NEEDED. DO NOT EXCEED 2 TABLETS IN 24 HOURS 27 tablet 2    Aspirin-Acetaminophen-Caffeine (EXCEDRIN PO) Take  by mouth.       loperamide (IMODIUM) 2 MG capsule Take 2 mg by mouth 4 times daily as needed for Diarrhea      FIBER ADULT GUMMIES PO Take by mouth daily      fesoterodine (TOVIAZ) 4 MG TB24 ER tablet Take 4 mg by mouth daily      SUMAtriptan Succinate 6 MG/0.5ML SOAJ Received phone call from patient to schedule Fibroscan.   Fibroscan ordered by Dr. Marcano.  Scheduled for 9/26.    04/24/2019     CMP    Lab Results   Component Value Date     04/24/2019    K 5.3 04/24/2019     04/24/2019    CO2 26 04/24/2019    BUN 20 04/24/2019    CREATININE 0.6 04/24/2019    GFRAA >60 04/24/2019    AGRATIO 1.2 04/24/2019    LABGLOM >60 04/24/2019    GLUCOSE 88 04/24/2019    PROT 7.6 04/24/2019    CALCIUM 9.3 04/24/2019    BILITOT <0.2 04/24/2019    ALKPHOS 72 04/24/2019    AST 16 04/24/2019    ALT 15 04/24/2019     BMP    Lab Results   Component Value Date     04/24/2019    K 5.3 04/24/2019     04/24/2019    CO2 26 04/24/2019    BUN 20 04/24/2019    CREATININE 0.6 04/24/2019    CALCIUM 9.3 04/24/2019    GFRAA >60 04/24/2019    LABGLOM >60 04/24/2019    GLUCOSE 88 04/24/2019     POCGlucose  No results for input(s): GLUCOSE in the last 72 hours. Coags  No results found for: PROTIME, INR, APTT  HCG (If Applicable) No results found for: PREGTESTUR, PREGSERUM, HCG, HCGQUANT   ABGs No results found for: PHART, PO2ART, MAW4OFT, IXN9XSJ, BEART, O1FJUJIW   Type & Screen (If Applicable)  No results found for: LABABO, LABRH                         BMI: Wt Readings from Last 3 Encounters:       NPO Status:8 hours                          Anesthesia Evaluation  Patient summary reviewed no history of anesthetic complications:   Airway: Mallampati: III  TM distance: >3 FB   Neck ROM: full   Dental: normal exam         Pulmonary:Negative Pulmonary ROS and normal exam                               Cardiovascular:Negative CV ROS  Exercise tolerance: good (>4 METS),           Rhythm: regular  Rate: normal           Beta Blocker:  Not on Beta Blocker         Neuro/Psych:   (+) neuromuscular disease:, headaches: migraine headaches,             GI/Hepatic/Renal: Neg GI/Hepatic/Renal ROS            Endo/Other: Negative Endo/Other ROS                    Abdominal:           Vascular: negative vascular ROS.                                        Anesthesia Plan      MAC     ASA 2       Induction: intravenous. Anesthetic plan and risks discussed with patient. Plan discussed with CRNA. This pre-anesthesia assessment may be used as a history and physical.    DOS STAFF ADDENDUM:    Pt seen and examined, chart reviewed (including anesthesia, drug and allergy history). No interval changes to history and physical examination. Anesthetic plan, risks, benefits, alternatives, and personnel involved discussed with patient. Patient verbalized an understanding and agrees to proceed.       Gio Javed MD  June 14, 2019  7:08 AM

## 2024-09-18 ENCOUNTER — OFFICE VISIT (OUTPATIENT)
Dept: FAMILY MEDICINE CLINIC | Age: 59
End: 2024-09-18
Payer: COMMERCIAL

## 2024-09-18 VITALS
OXYGEN SATURATION: 100 % | BODY MASS INDEX: 27.29 KG/M2 | DIASTOLIC BLOOD PRESSURE: 70 MMHG | HEIGHT: 63 IN | WEIGHT: 154 LBS | SYSTOLIC BLOOD PRESSURE: 122 MMHG | HEART RATE: 55 BPM

## 2024-09-18 DIAGNOSIS — G43.709 CHRONIC MIGRAINE WITHOUT AURA WITHOUT STATUS MIGRAINOSUS, NOT INTRACTABLE: ICD-10-CM

## 2024-09-18 DIAGNOSIS — Z00.00 ANNUAL PHYSICAL EXAM: Primary | ICD-10-CM

## 2024-09-18 PROCEDURE — 99396 PREV VISIT EST AGE 40-64: CPT | Performed by: FAMILY MEDICINE

## 2024-10-03 DIAGNOSIS — G43.709 CHRONIC MIGRAINE WITHOUT AURA WITHOUT STATUS MIGRAINOSUS, NOT INTRACTABLE: ICD-10-CM

## 2024-10-03 RX ORDER — UBROGEPANT 100 MG/1
TABLET ORAL
Qty: 16 TABLET | Refills: 0 | Status: SHIPPED | OUTPATIENT
Start: 2024-10-03

## 2024-10-03 NOTE — TELEPHONE ENCOUNTER
Medication:   Requested Prescriptions     Pending Prescriptions Disp Refills    Ubrogepant (UBRELVY) 100 MG TABS [Pharmacy Med Name: UBRELVY 100MG TABS] 16 tablet 0     Sig: TAKE ONE TABLET BY MOUTH AT ONSET OF HEADACHE, MAY REPEAT DOSE IN 2 HOURS IF NO RELIEF. MAX DAILY DOSE: 2 TABLETS        Last Filled:  9/4/2024    Patient Phone Number: [phone] (home) [phone] (work)    Last appt: 9/18/2024   Next appt: Visit date not found    Last OARRS:        No data to display

## 2024-11-04 DIAGNOSIS — G43.709 CHRONIC MIGRAINE WITHOUT AURA WITHOUT STATUS MIGRAINOSUS, NOT INTRACTABLE: ICD-10-CM

## 2024-11-04 RX ORDER — UBROGEPANT 100 MG/1
TABLET ORAL
Qty: 16 TABLET | Refills: 0 | Status: SHIPPED | OUTPATIENT
Start: 2024-11-04

## 2024-11-04 NOTE — TELEPHONE ENCOUNTER
Medication:   Requested Prescriptions     Pending Prescriptions Disp Refills    UBRELVY 100 MG TABS [Pharmacy Med Name: UBRELVY 100MG TABS] 16 tablet 0     Sig: TAKE ONE TABLET BY MOUTH AT ONSET OF HEADACHE, MAY REPEAT DOSE IN 2 HOURS IF NO RELIEF. MAX DAILY DOSE: 2 TABLETS        Last Filled:      Patient Phone Number: [phone] (home) [phone] (work)    Last appt: 9/18/2024   Next appt: Visit date not found    Last OARRS:        No data to display

## 2024-11-27 DIAGNOSIS — G43.709 CHRONIC MIGRAINE WITHOUT AURA WITHOUT STATUS MIGRAINOSUS, NOT INTRACTABLE: ICD-10-CM

## 2024-11-29 RX ORDER — UBROGEPANT 100 MG/1
TABLET ORAL
Qty: 16 TABLET | Refills: 0 | Status: SHIPPED | OUTPATIENT
Start: 2024-11-29

## 2024-11-29 NOTE — TELEPHONE ENCOUNTER
Medication:   Requested Prescriptions     Pending Prescriptions Disp Refills    UBRELVY 100 MG TABS [Pharmacy Med Name: UBRELVY 100MG TABS] 16 tablet 0     Sig: TAKE ONE TABLET BY MOUTH AT ONSET OF HEADACHE, MAY REPEAT DOSE IN 2 HOURS IF NO RELIEF. MAX DAILY DOSE: 2 TABLETS        Last Filled:  11/04/2024 #16 0rf     Patient Phone Number: [phone] (home) [phone] (work)    Last appt: 9/18/2024   Next appt: Visit date not found    Last OARRS:        No data to display

## 2024-12-23 DIAGNOSIS — G43.709 CHRONIC MIGRAINE WITHOUT AURA WITHOUT STATUS MIGRAINOSUS, NOT INTRACTABLE: ICD-10-CM

## 2024-12-24 DIAGNOSIS — G43.709 CHRONIC MIGRAINE WITHOUT AURA WITHOUT STATUS MIGRAINOSUS, NOT INTRACTABLE: ICD-10-CM

## 2024-12-24 RX ORDER — UBROGEPANT 100 MG/1
TABLET ORAL
Qty: 16 TABLET | Refills: 0 | Status: SHIPPED | OUTPATIENT
Start: 2024-12-24 | End: 2024-12-26 | Stop reason: SDUPTHER

## 2024-12-24 NOTE — TELEPHONE ENCOUNTER
Medication:   Requested Prescriptions     Pending Prescriptions Disp Refills    Ubrogepant (UBRELVY) 100 MG TABS 16 tablet 0     Sig: TAKE ONE TABLET BY MOUTH AT ONSET OF HEADACHE. MAY REPEAT DOSE IN 2 HOURS IF NO RELIEF. DO NOT EXCEED TWO TABLETS IN 24 HOURS        Last Filled:  12/24/2024 #16 0rf    Duplicate request      Patient Phone Number: [phone] (home) [phone] (work)    Last appt: 9/18/2024   Next appt: Visit date not found    Last OARRS:        No data to display

## 2024-12-26 ENCOUNTER — PATIENT MESSAGE (OUTPATIENT)
Dept: FAMILY MEDICINE CLINIC | Age: 59
End: 2024-12-26

## 2024-12-26 DIAGNOSIS — G43.709 CHRONIC MIGRAINE WITHOUT AURA WITHOUT STATUS MIGRAINOSUS, NOT INTRACTABLE: ICD-10-CM

## 2024-12-26 RX ORDER — UBROGEPANT 100 MG/1
TABLET ORAL
Qty: 16 TABLET | Refills: 0 | Status: SHIPPED | OUTPATIENT
Start: 2024-12-26

## 2024-12-26 RX ORDER — UBROGEPANT 100 MG/1
TABLET ORAL
Qty: 16 TABLET | Refills: 0 | Status: SHIPPED | OUTPATIENT
Start: 2024-12-26 | End: 2024-12-26 | Stop reason: SDUPTHER

## 2024-12-26 NOTE — TELEPHONE ENCOUNTER
Pt needs this to go to [facility] not [facility].    [facility] - [place], [place] - [address], [address] - P [phone] - F [phone]     Pharmacy has been changed in the system

## 2024-12-26 NOTE — TELEPHONE ENCOUNTER
Medication:   Requested Prescriptions     Pending Prescriptions Disp Refills    Ubrogepant (UBRELVY) 100 MG TABS 16 tablet 0     Sig: TAKE ONE TABLET BY MOUTH AT ONSET OF HEADACHE. MAY REPEAT DOSE IN 2 HOURS IF NO RELIEF. DO NOT EXCEED TWO TABLETS IN 24 HOURS        Last Filled:      Patient Phone Number: [phone] (home) [phone] (work)    Last appt: 9/18/2024   Next appt: Visit date not found    Last OARRS:        No data to display

## 2025-01-16 ENCOUNTER — PATIENT MESSAGE (OUTPATIENT)
Dept: FAMILY MEDICINE CLINIC | Age: 60
End: 2025-01-16

## 2025-01-16 DIAGNOSIS — G43.709 CHRONIC MIGRAINE WITHOUT AURA WITHOUT STATUS MIGRAINOSUS, NOT INTRACTABLE: ICD-10-CM

## 2025-01-16 RX ORDER — UBROGEPANT 100 MG/1
TABLET ORAL
Qty: 16 TABLET | Refills: 0 | Status: SHIPPED | OUTPATIENT
Start: 2025-01-16

## 2025-01-16 NOTE — TELEPHONE ENCOUNTER
Medication:   Requested Prescriptions     Pending Prescriptions Disp Refills    UBRELVY 100 MG TABS [Pharmacy Med Name: UBRELVY 100MG TABS] 16 tablet 0     Sig: TAKE ONE TABLET BY MOUTH AT ONSET OF HEADACHE. MAY REPEAT DOSE IN 2 HOURS IF NO RELIEF. DO NOT EXCEED TWO TABLETS IN 24 HOURS     Last Filled:  12/26/24    Last appt: 9/18/2024   Next appt: Visit date not found    Last OARRS:        No data to display

## 2025-02-13 DIAGNOSIS — G43.709 CHRONIC MIGRAINE WITHOUT AURA WITHOUT STATUS MIGRAINOSUS, NOT INTRACTABLE: ICD-10-CM

## 2025-02-14 RX ORDER — UBROGEPANT 100 MG/1
TABLET ORAL
Qty: 16 TABLET | Refills: 0 | Status: SHIPPED | OUTPATIENT
Start: 2025-02-14

## 2025-02-14 NOTE — TELEPHONE ENCOUNTER
Medication:   Requested Prescriptions     Pending Prescriptions Disp Refills    UBRELVY 100 MG TABS [Pharmacy Med Name: UBRELVY 100MG TABS] 16 tablet 0     Sig: TAKE ONE TABLET BY MOUTH AT ONSET OF HEADACHE. MAY REPEAT DOSE IN 2 HOURS IF NO RELIEF. DO NOT EXCEED TWO TABLETS IN 24 HOURS        Last Filled:  01/16/2025 #16 0rf     Patient Phone Number: [phone] (home) [phone] (work)    Last appt: 9/18/2024   Next appt: Visit date not found    Last OARRS:        No data to display

## 2025-03-03 DIAGNOSIS — G43.711 INTRACTABLE CHRONIC MIGRAINE WITHOUT AURA AND WITH STATUS MIGRAINOSUS: ICD-10-CM

## 2025-03-03 RX ORDER — CEFUROXIME AXETIL 250 MG/1
TABLET ORAL
Qty: 3 ML | Refills: 3 | Status: SHIPPED | OUTPATIENT
Start: 2025-03-03

## 2025-03-03 NOTE — TELEPHONE ENCOUNTER
Medication:   Requested Prescriptions     Pending Prescriptions Disp Refills    SUMAtriptan (IMITREX) 6 MG/0.5ML SOAJ injection syringe [Pharmacy Med Name: SUMATRIPTAN 6MG/0.5ML PF INJ2X0.5ML] 3 mL 3     Sig: INJECT 0.5 ML UNDER THE SKIN AS DIRECTED AS NEEDED FOR MIGRAINE, MAY REPEAT ONCE AFTER 1 HOUR, NOT TO EXCEED 2 DOSES IN 24 HOURS        Last Filled:  11/15/2023    Patient Phone Number: [phone] (home) [phone] (work)    Last appt: 9/18/2024   Next appt: Visit date not found    Last OARRS:        No data to display

## 2025-03-17 DIAGNOSIS — G43.709 CHRONIC MIGRAINE WITHOUT AURA WITHOUT STATUS MIGRAINOSUS, NOT INTRACTABLE: ICD-10-CM

## 2025-03-17 RX ORDER — UBROGEPANT 100 MG/1
TABLET ORAL
Qty: 16 TABLET | Refills: 0 | Status: SHIPPED | OUTPATIENT
Start: 2025-03-17

## 2025-03-17 NOTE — TELEPHONE ENCOUNTER
Medication:   Requested Prescriptions     Pending Prescriptions Disp Refills    UBRELVY 100 MG TABS [Pharmacy Med Name: UBRELVY 100MG TABS] 16 tablet 0     Sig: TAKE ONE TABLET BY MOUTH AT ONSET OF HEADACHE. MAY REPEAT DOSE IN 2 HOURS IF NO RELIEF. DO NOT EXCEED TWO TABLETS IN 24 HOURS        Last Filled:  2/14/2025 16 tablet, Refills: 0 ordered     Patient Phone Number: [phone] (home) [phone] (work)    Last appt: 9/18/2024   Next appt: Visit date not found    Last OARRS:        No data to display

## 2025-04-14 DIAGNOSIS — G43.709 CHRONIC MIGRAINE WITHOUT AURA WITHOUT STATUS MIGRAINOSUS, NOT INTRACTABLE: ICD-10-CM

## 2025-04-14 RX ORDER — UBROGEPANT 100 MG/1
TABLET ORAL
Qty: 16 TABLET | Refills: 0 | Status: SHIPPED | OUTPATIENT
Start: 2025-04-14

## 2025-04-14 NOTE — TELEPHONE ENCOUNTER
Medication:   Requested Prescriptions     Pending Prescriptions Disp Refills    UBRELVY 100 MG TABS [Pharmacy Med Name: UBRELVY 100MG TABS] 16 tablet 0     Sig: TAKE ONE TABLET BY MOUTH AT ONSET OF HEADACHE. MAY REPEAT DOSE IN 2 HOURS IF NO RELIEF. DO NOT EXCEED TWO TABLETS IN 24 HOURS        Last Filled:  03/17/2025 #16 0rf     Patient Phone Number: [phone] (home) [phone] (work)    Last appt: 9/18/2024   Next appt: Visit date not found    Last OARRS:        No data to display

## 2025-04-16 ENCOUNTER — TELEPHONE (OUTPATIENT)
Dept: ADMINISTRATIVE | Age: 60
End: 2025-04-16

## 2025-04-18 NOTE — TELEPHONE ENCOUNTER
Submitted PA for Ubrelvy 100MG tablets   Via [facility] Key: [id number] STATUS: PENDING.    Follow up done daily; if no decision with in three days we will refax.  If another three days goes by with no decision will call the insurance for status.

## 2025-04-22 NOTE — TELEPHONE ENCOUNTER
Patient picked up the medication was sent to her by the mail pharmacy and no problem.  Patient informed that Pa was done on medication and it is approved.  No questions at this time

## 2025-04-22 NOTE — TELEPHONE ENCOUNTER
The medication is APPROVED.      Outcome  Approved on April 21 by [employer] 2017  The request has been approved. The authorization is effective from 05/06/2025 to 05/05/2026, as long as the member is enrolled in their current health plan. The request was approved as submitted. This request has been approved with a quantity limit of 16 tablets per 30 days. A written notification letter will follow with additional details.  Effective Date: 5/6/2025  Authorization Expiration Date: 5/5/2026    If this requires a response please respond to the pool ( P MHCX PSC MEDICATION PRE-AUTH).      Thank you please advise patient.

## 2025-04-26 ENCOUNTER — HOSPITAL ENCOUNTER (OUTPATIENT)
Dept: WOMENS IMAGING | Age: 60
Discharge: HOME | End: 2025-04-26
Payer: COMMERCIAL

## 2025-04-26 VITALS — BODY MASS INDEX: 26.58 KG/M2 | WEIGHT: 150 LBS | HEIGHT: 63 IN

## 2025-04-26 DIAGNOSIS — Z80.3 FAMILY HISTORY OF BREAST CANCER: ICD-10-CM

## 2025-04-26 DIAGNOSIS — Z12.31 VISIT FOR SCREENING MAMMOGRAM: ICD-10-CM

## 2025-04-26 DIAGNOSIS — Z91.89 AT HIGH RISK FOR BREAST CANCER: ICD-10-CM

## 2025-04-26 PROCEDURE — 77063 BREAST TOMOSYNTHESIS BI: CPT

## 2025-05-12 DIAGNOSIS — G43.709 CHRONIC MIGRAINE WITHOUT AURA WITHOUT STATUS MIGRAINOSUS, NOT INTRACTABLE: ICD-10-CM

## 2025-05-12 RX ORDER — UBROGEPANT 100 MG/1
TABLET ORAL
Qty: 16 TABLET | Refills: 0 | Status: SHIPPED | OUTPATIENT
Start: 2025-05-12

## 2025-05-12 NOTE — TELEPHONE ENCOUNTER
Medication:   Requested Prescriptions     Pending Prescriptions Disp Refills    Ubrogepant (UBRELVY) 100 MG TABS [Pharmacy Med Name: UBRELVY 100MG TABS] 16 tablet 0     Sig: TAKE ONE TABLET BY MOUTH AT ONSET OF HEADACHE. MAY REPEAT DOSE IN TWO HOURS IF NO RELIEF. DO NOT EXCEED TWO TABLETS IN 24 HOURS        Last Filled:  4/14/2025    Patient Phone Number: [phone] (home) [phone] (work)    Last appt: 9/18/2024   Next appt: Visit date not found    Last OARRS:        No data to display

## 2025-06-03 DIAGNOSIS — G43.709 CHRONIC MIGRAINE WITHOUT AURA WITHOUT STATUS MIGRAINOSUS, NOT INTRACTABLE: ICD-10-CM

## 2025-06-03 RX ORDER — UBROGEPANT 100 MG/1
TABLET ORAL
Qty: 16 TABLET | Refills: 0 | Status: SHIPPED | OUTPATIENT
Start: 2025-06-03

## 2025-06-03 NOTE — TELEPHONE ENCOUNTER
Medication:   Requested Prescriptions     Pending Prescriptions Disp Refills    UBRELVY 100 MG TABS [Pharmacy Med Name: UBRELVY 100MG TABS] 16 tablet 0     Sig: TAKE ONE TABLET BY MOUTH AT ONSET OF HEADACHE. MAY REPEAT DOSE IN TWO HOURS IF NO RELIEF. DO NOT EXCEED TWO TABLETS IN 24 HOURS        Last Filled:  05/12/2025 # 16 0rf     Patient Phone Number: [phone] (home) [phone] (work)    Last appt: 9/18/2024   Next appt: Visit date not found    Last OARRS:        No data to display

## 2025-06-04 ENCOUNTER — TRANSCRIBE ORDERS (OUTPATIENT)
Dept: ADMINISTRATIVE | Age: 60
End: 2025-06-04

## 2025-06-04 DIAGNOSIS — Z80.3 FAMILY HISTORY OF BREAST CANCER: Primary | ICD-10-CM

## 2025-06-04 DIAGNOSIS — R92.333 HETEROGENEOUSLY DENSE TISSUE OF BOTH BREASTS ON MAMMOGRAPHY: ICD-10-CM

## 2025-07-03 DIAGNOSIS — G43.709 CHRONIC MIGRAINE WITHOUT AURA WITHOUT STATUS MIGRAINOSUS, NOT INTRACTABLE: ICD-10-CM

## 2025-07-03 RX ORDER — UBROGEPANT 100 MG/1
TABLET ORAL
Qty: 16 TABLET | Refills: 0 | Status: SHIPPED | OUTPATIENT
Start: 2025-07-03

## 2025-07-03 NOTE — TELEPHONE ENCOUNTER
Medication:   Requested Prescriptions     Pending Prescriptions Disp Refills    Ubrogepant (UBRELVY) 100 MG TABS [Pharmacy Med Name: UBRELVY 100MG TABS] 16 tablet 0     Sig: TAKE ONE TABLET BY MOUTH AT ONSET OF HEADACHE; MAY REPEAT DOSE IN 2 HOURS IF NO RELIEF; DO NOT EXCEED TWO TABLETS IN 24 HOURS        Last Filled:  6/3/2025 16 tabs 0 refills     Patient Phone Number: [phone] (home) [phone] (work)    Last appt: 9/18/2024   Next appt: Visit date not found    Last OARRS:        No data to display

## 2025-08-01 DIAGNOSIS — G43.709 CHRONIC MIGRAINE WITHOUT AURA WITHOUT STATUS MIGRAINOSUS, NOT INTRACTABLE: ICD-10-CM

## 2025-08-01 RX ORDER — UBROGEPANT 100 MG/1
TABLET ORAL
Qty: 16 TABLET | Refills: 0 | Status: SHIPPED | OUTPATIENT
Start: 2025-08-01

## 2025-08-01 NOTE — TELEPHONE ENCOUNTER
Medication:   Requested Prescriptions     Pending Prescriptions Disp Refills    UBRELVY 100 MG TABS [Pharmacy Med Name: UBRELVY 100MG TABS] 16 tablet 0     Sig: TAKE ONE TABLET BY MOUTH AT ONSET OF HEADACHE; MAY REPEAT DOSE IN 2 HOURS IF NO RELIEF; DO NOT EXCEED TWO TABLETS IN 24 HOURS        Last Filled:  07/03/2025 #16 0rf     Patient Phone Number: [phone] (home) [phone] (work)    Last appt: 9/18/2024   Next appt: Visit date not found    Last OARRS:        No data to display

## 2025-08-26 DIAGNOSIS — G43.709 CHRONIC MIGRAINE WITHOUT AURA WITHOUT STATUS MIGRAINOSUS, NOT INTRACTABLE: ICD-10-CM

## 2025-08-26 RX ORDER — UBROGEPANT 100 MG/1
TABLET ORAL
Qty: 16 TABLET | Refills: 0 | Status: SHIPPED | OUTPATIENT
Start: 2025-08-26

## (undated) DEVICE — BW-412T DISP COMBO CLEANING BRUSH: Brand: SINGLE USE COMBINATION CLEANING BRUSH

## (undated) DEVICE — PROCEDURE KIT ENDOSCP CUST

## (undated) DEVICE — SOLUTION IV IRRIG WATER 500ML POUR BRL ST 2F7113

## (undated) DEVICE — SET VLV 3 PC AWS DISPOSABLE GRDIAN SCOPEVALET

## (undated) DEVICE — 60 ML SYRINGE,REGULAR TIP: Brand: MONOJECT